# Patient Record
Sex: FEMALE
[De-identification: names, ages, dates, MRNs, and addresses within clinical notes are randomized per-mention and may not be internally consistent; named-entity substitution may affect disease eponyms.]

---

## 2017-06-19 ENCOUNTER — HOSPITAL (OUTPATIENT)
Dept: OTHER | Age: 57
End: 2017-06-19

## 2018-01-01 ENCOUNTER — ANESTHESIA EVENT (OUTPATIENT)
Dept: ENDOSCOPY | Facility: HOSPITAL | Age: 58
DRG: 181 | End: 2018-01-01
Payer: COMMERCIAL

## 2018-01-01 ENCOUNTER — OFFICE VISIT (OUTPATIENT)
Dept: HEMATOLOGY/ONCOLOGY | Facility: HOSPITAL | Age: 58
End: 2018-01-01
Attending: SURGERY
Payer: COMMERCIAL

## 2018-01-01 ENCOUNTER — OFFICE VISIT (OUTPATIENT)
Dept: HEMATOLOGY/ONCOLOGY | Facility: HOSPITAL | Age: 58
End: 2018-01-01
Attending: INTERNAL MEDICINE
Payer: COMMERCIAL

## 2018-01-01 ENCOUNTER — LAB ENCOUNTER (OUTPATIENT)
Dept: LAB | Facility: HOSPITAL | Age: 58
End: 2018-01-01
Attending: INTERNAL MEDICINE
Payer: COMMERCIAL

## 2018-01-01 ENCOUNTER — HOSPITAL ENCOUNTER (OUTPATIENT)
Dept: CV DIAGNOSTICS | Facility: HOSPITAL | Age: 58
Discharge: HOME | End: 2018-01-01
Attending: PATHOLOGY
Payer: COMMERCIAL

## 2018-01-01 ENCOUNTER — OFFICE VISIT (OUTPATIENT)
Dept: HEMATOLOGY/ONCOLOGY | Age: 58
End: 2018-01-01
Attending: INTERNAL MEDICINE
Payer: COMMERCIAL

## 2018-01-01 ENCOUNTER — HOSPITAL ENCOUNTER (OUTPATIENT)
Dept: CT IMAGING | Facility: HOSPITAL | Age: 58
Discharge: HOME | End: 2018-01-01
Attending: INTERNAL MEDICINE
Payer: COMMERCIAL

## 2018-01-01 ENCOUNTER — APPOINTMENT (OUTPATIENT)
Dept: CT IMAGING | Facility: HOSPITAL | Age: 58
DRG: 871 | End: 2018-01-01
Attending: EMERGENCY MEDICINE
Payer: COMMERCIAL

## 2018-01-01 ENCOUNTER — TELEPHONE (OUTPATIENT)
Dept: HEMATOLOGY/ONCOLOGY | Facility: HOSPITAL | Age: 58
End: 2018-01-01

## 2018-01-01 ENCOUNTER — ANESTHESIA (OUTPATIENT)
Dept: ENDOSCOPY | Facility: HOSPITAL | Age: 58
DRG: 181 | End: 2018-01-01
Payer: COMMERCIAL

## 2018-01-01 ENCOUNTER — ANESTHESIA (OUTPATIENT)
Dept: SURGERY | Facility: HOSPITAL | Age: 58
End: 2018-01-01

## 2018-01-01 ENCOUNTER — NURSE ONLY (OUTPATIENT)
Dept: HEMATOLOGY/ONCOLOGY | Age: 58
End: 2018-01-01
Attending: INTERNAL MEDICINE
Payer: COMMERCIAL

## 2018-01-01 ENCOUNTER — MED REC SCAN ONLY (OUTPATIENT)
Dept: HEMATOLOGY/ONCOLOGY | Facility: HOSPITAL | Age: 58
End: 2018-01-01

## 2018-01-01 ENCOUNTER — SOCIAL WORK SERVICES (OUTPATIENT)
Dept: HEMATOLOGY/ONCOLOGY | Facility: HOSPITAL | Age: 58
End: 2018-01-01

## 2018-01-01 ENCOUNTER — APPOINTMENT (OUTPATIENT)
Dept: CV DIAGNOSTICS | Facility: HOSPITAL | Age: 58
DRG: 181 | End: 2018-01-01
Attending: INTERNAL MEDICINE
Payer: COMMERCIAL

## 2018-01-01 ENCOUNTER — HOSPITAL ENCOUNTER (OUTPATIENT)
Facility: HOSPITAL | Age: 58
Setting detail: HOSPITAL OUTPATIENT SURGERY
Discharge: HOME | End: 2018-01-01
Attending: SURGERY | Admitting: SURGERY
Payer: COMMERCIAL

## 2018-01-01 ENCOUNTER — SURGERY (OUTPATIENT)
Age: 58
End: 2018-01-01

## 2018-01-01 ENCOUNTER — APPOINTMENT (OUTPATIENT)
Dept: GENERAL RADIOLOGY | Facility: HOSPITAL | Age: 58
DRG: 871 | End: 2018-01-01
Attending: INTERNAL MEDICINE
Payer: COMMERCIAL

## 2018-01-01 ENCOUNTER — APPOINTMENT (OUTPATIENT)
Dept: GENERAL RADIOLOGY | Facility: HOSPITAL | Age: 58
End: 2018-01-01
Attending: SURGERY
Payer: COMMERCIAL

## 2018-01-01 ENCOUNTER — APPOINTMENT (OUTPATIENT)
Dept: CV DIAGNOSTICS | Facility: HOSPITAL | Age: 58
DRG: 871 | End: 2018-01-01
Attending: INTERNAL MEDICINE
Payer: COMMERCIAL

## 2018-01-01 ENCOUNTER — HOSPITAL ENCOUNTER (INPATIENT)
Dept: CV DIAGNOSTICS | Facility: HOSPITAL | Age: 58
LOS: 4 days | Discharge: HOME | DRG: 181 | End: 2018-01-01
Attending: HOSPITALIST | Admitting: HOSPITALIST
Payer: COMMERCIAL

## 2018-01-01 ENCOUNTER — HOSPITAL ENCOUNTER (INPATIENT)
Facility: HOSPITAL | Age: 58
LOS: 11 days | Discharge: HOSPICE | DRG: 871 | End: 2018-01-01
Attending: EMERGENCY MEDICINE | Admitting: INTERNAL MEDICINE
Payer: COMMERCIAL

## 2018-01-01 ENCOUNTER — HOSPITAL ENCOUNTER (OUTPATIENT)
Dept: CV DIAGNOSTICS | Facility: HOSPITAL | Age: 58
Discharge: HOME | End: 2018-01-01
Attending: INTERNAL MEDICINE
Payer: COMMERCIAL

## 2018-01-01 ENCOUNTER — APPOINTMENT (OUTPATIENT)
Dept: CT IMAGING | Facility: HOSPITAL | Age: 58
DRG: 871 | End: 2018-01-01
Attending: INTERNAL MEDICINE
Payer: COMMERCIAL

## 2018-01-01 ENCOUNTER — APPOINTMENT (OUTPATIENT)
Dept: ULTRASOUND IMAGING | Facility: HOSPITAL | Age: 58
DRG: 871 | End: 2018-01-01
Attending: INTERNAL MEDICINE
Payer: COMMERCIAL

## 2018-01-01 ENCOUNTER — ANESTHESIA EVENT (OUTPATIENT)
Dept: SURGERY | Facility: HOSPITAL | Age: 58
End: 2018-01-01

## 2018-01-01 ENCOUNTER — APPOINTMENT (OUTPATIENT)
Dept: HEMATOLOGY/ONCOLOGY | Age: 58
End: 2018-01-01
Attending: INTERNAL MEDICINE
Payer: COMMERCIAL

## 2018-01-01 VITALS
HEART RATE: 125 BPM | DIASTOLIC BLOOD PRESSURE: 89 MMHG | SYSTOLIC BLOOD PRESSURE: 138 MMHG | RESPIRATION RATE: 18 BRPM | TEMPERATURE: 97 F | WEIGHT: 168.19 LBS | BODY MASS INDEX: 29 KG/M2 | OXYGEN SATURATION: 94 %

## 2018-01-01 VITALS
BODY MASS INDEX: 28.47 KG/M2 | SYSTOLIC BLOOD PRESSURE: 106 MMHG | DIASTOLIC BLOOD PRESSURE: 77 MMHG | HEART RATE: 117 BPM | RESPIRATION RATE: 18 BRPM | WEIGHT: 166.75 LBS | TEMPERATURE: 98 F | OXYGEN SATURATION: 93 % | HEIGHT: 64 IN

## 2018-01-01 VITALS
BODY MASS INDEX: 27 KG/M2 | HEART RATE: 115 BPM | OXYGEN SATURATION: 93 % | TEMPERATURE: 98 F | DIASTOLIC BLOOD PRESSURE: 70 MMHG | SYSTOLIC BLOOD PRESSURE: 115 MMHG | RESPIRATION RATE: 16 BRPM | WEIGHT: 157.44 LBS

## 2018-01-01 VITALS
OXYGEN SATURATION: 88 % | HEART RATE: 137 BPM | TEMPERATURE: 99 F | SYSTOLIC BLOOD PRESSURE: 112 MMHG | DIASTOLIC BLOOD PRESSURE: 77 MMHG | WEIGHT: 171.5 LBS | BODY MASS INDEX: 30 KG/M2 | RESPIRATION RATE: 22 BRPM

## 2018-01-01 VITALS
HEIGHT: 63.78 IN | WEIGHT: 168.38 LBS | SYSTOLIC BLOOD PRESSURE: 132 MMHG | DIASTOLIC BLOOD PRESSURE: 84 MMHG | HEART RATE: 120 BPM | RESPIRATION RATE: 20 BRPM | BODY MASS INDEX: 29.1 KG/M2 | OXYGEN SATURATION: 96 % | TEMPERATURE: 99 F

## 2018-01-01 VITALS
OXYGEN SATURATION: 97 % | DIASTOLIC BLOOD PRESSURE: 78 MMHG | HEART RATE: 112 BPM | TEMPERATURE: 98 F | RESPIRATION RATE: 20 BRPM | SYSTOLIC BLOOD PRESSURE: 112 MMHG

## 2018-01-01 VITALS
DIASTOLIC BLOOD PRESSURE: 72 MMHG | OXYGEN SATURATION: 97 % | BODY MASS INDEX: 32.21 KG/M2 | HEART RATE: 109 BPM | SYSTOLIC BLOOD PRESSURE: 113 MMHG | HEIGHT: 64 IN | TEMPERATURE: 99 F | WEIGHT: 188.69 LBS | RESPIRATION RATE: 18 BRPM

## 2018-01-01 DIAGNOSIS — C79.51 BONE METASTASIS (HCC): ICD-10-CM

## 2018-01-01 DIAGNOSIS — I31.3 PERICARDIAL EFFUSION: ICD-10-CM

## 2018-01-01 DIAGNOSIS — C80.1 MALIGNANT PERICARDIAL EFFUSION (HCC): ICD-10-CM

## 2018-01-01 DIAGNOSIS — C34.91 ADENOCARCINOMA OF RIGHT LUNG (HCC): Primary | ICD-10-CM

## 2018-01-01 DIAGNOSIS — R61 GENERALIZED HYPERHIDROSIS: ICD-10-CM

## 2018-01-01 DIAGNOSIS — R50.9 FEVER, UNSPECIFIED: Primary | ICD-10-CM

## 2018-01-01 DIAGNOSIS — R50.81 FEVER IN OTHER DISEASES: Primary | ICD-10-CM

## 2018-01-01 DIAGNOSIS — I31.1 CONSTRICTIVE PERICARDITIS: ICD-10-CM

## 2018-01-01 DIAGNOSIS — H33.22 EXUDATIVE RETINAL DETACHMENT OF LEFT EYE: ICD-10-CM

## 2018-01-01 DIAGNOSIS — G93.3 POST VIRAL SYNDROME: ICD-10-CM

## 2018-01-01 DIAGNOSIS — R79.82 CRP ELEVATED: ICD-10-CM

## 2018-01-01 DIAGNOSIS — D72.829 LEUKOCYTOSIS, UNSPECIFIED TYPE: ICD-10-CM

## 2018-01-01 DIAGNOSIS — C78.7 LIVER METASTASIS (HCC): ICD-10-CM

## 2018-01-01 DIAGNOSIS — R74.01 TRANSAMINITIS: ICD-10-CM

## 2018-01-01 DIAGNOSIS — R79.89 ELEVATED LFTS: ICD-10-CM

## 2018-01-01 DIAGNOSIS — D72.829 LEUKOCYTOSIS (LEUCOCYTOSIS): ICD-10-CM

## 2018-01-01 DIAGNOSIS — R21 FACIAL RASH: ICD-10-CM

## 2018-01-01 DIAGNOSIS — R01.1 HEART MURMUR: ICD-10-CM

## 2018-01-01 DIAGNOSIS — R74.02 ELEVATED LDH: ICD-10-CM

## 2018-01-01 DIAGNOSIS — R79.82 ELEVATED C-REACTIVE PROTEIN (CRP): ICD-10-CM

## 2018-01-01 DIAGNOSIS — C79.31 SECONDARY CANCER OF BRAIN (HCC): ICD-10-CM

## 2018-01-01 DIAGNOSIS — R06.02 SOB (SHORTNESS OF BREATH): ICD-10-CM

## 2018-01-01 DIAGNOSIS — R77.8 ELEVATED TROPONIN: ICD-10-CM

## 2018-01-01 DIAGNOSIS — R06.00 DYSPNEA, UNSPECIFIED TYPE: ICD-10-CM

## 2018-01-01 DIAGNOSIS — C34.91 ADENOCARCINOMA OF RIGHT LUNG (HCC): ICD-10-CM

## 2018-01-01 DIAGNOSIS — C34.90 PRIMARY MALIGNANT NEOPLASM OF LUNG METASTATIC TO OTHER SITE, UNSPECIFIED LATERALITY (HCC): ICD-10-CM

## 2018-01-01 DIAGNOSIS — I31.3 MALIGNANT PERICARDIAL EFFUSION (HCC): ICD-10-CM

## 2018-01-01 DIAGNOSIS — C79.31 SECONDARY CANCER OF BRAIN (HCC): Primary | ICD-10-CM

## 2018-01-01 DIAGNOSIS — R74.01 NONSPECIFIC ELEVATION OF LEVELS OF TRANSAMINASE OR LACTIC ACID DEHYDROGENASE (LDH): Primary | ICD-10-CM

## 2018-01-01 DIAGNOSIS — Z71.9 ENCOUNTER FOR HEALTH EDUCATION: Primary | ICD-10-CM

## 2018-01-01 DIAGNOSIS — R50.9 FEVER OF UNKNOWN ORIGIN: ICD-10-CM

## 2018-01-01 DIAGNOSIS — R53.1 WEAKNESS: ICD-10-CM

## 2018-01-01 DIAGNOSIS — R74.01 ELEVATED TRANSAMINASE LEVEL: ICD-10-CM

## 2018-01-01 DIAGNOSIS — R05.9 COUGH: ICD-10-CM

## 2018-01-01 DIAGNOSIS — R74.02 NONSPECIFIC ELEVATION OF LEVELS OF TRANSAMINASE OR LACTIC ACID DEHYDROGENASE (LDH): Primary | ICD-10-CM

## 2018-01-01 DIAGNOSIS — E87.6 HYPOKALEMIA: ICD-10-CM

## 2018-01-01 DIAGNOSIS — D68.9 COAGULOPATHY (HCC): ICD-10-CM

## 2018-01-01 DIAGNOSIS — R93.89 ABNORMAL FINDINGS ON IMAGING TEST: ICD-10-CM

## 2018-01-01 DIAGNOSIS — D69.6 THROMBOCYTOPENIA (HCC): ICD-10-CM

## 2018-01-01 LAB
ALBUMIN SERPL-MCNC: 2.9 G/DL (ref 3.5–4.8)
ALBUMIN SERPL-MCNC: 3.1 G/DL (ref 3.5–4.8)
ALBUMIN SERPL-MCNC: 3.3 G/DL (ref 3.5–4.8)
ALP LIVER SERPL-CCNC: 194 U/L (ref 46–118)
ALP LIVER SERPL-CCNC: 234 U/L (ref 46–118)
ALP LIVER SERPL-CCNC: 275 U/L (ref 46–118)
ALT SERPL-CCNC: 40 U/L (ref 14–54)
ALT SERPL-CCNC: 54 U/L (ref 14–54)
ALT SERPL-CCNC: 87 U/L (ref 14–54)
ANA SCREEN: NEGATIVE
ANGIOTENSIN CONVERTING ENZYME: 45 U/L
AST SERPL-CCNC: 121 U/L (ref 15–41)
AST SERPL-CCNC: 168 U/L (ref 15–41)
AST SERPL-CCNC: 98 U/L (ref 15–41)
BASOPHILS # BLD AUTO: 0.06 X10(3) UL (ref 0–0.1)
BASOPHILS # BLD AUTO: 0.08 X10(3) UL (ref 0–0.1)
BASOPHILS # BLD AUTO: 0.08 X10(3) UL (ref 0–0.1)
BASOPHILS NFR BLD AUTO: 0.3 %
BASOPHILS NFR BLD AUTO: 0.5 %
BASOPHILS NFR BLD AUTO: 0.5 %
BILIRUB SERPL-MCNC: 0.5 MG/DL (ref 0.1–2)
BILIRUB SERPL-MCNC: 0.6 MG/DL (ref 0.1–2)
BILIRUB SERPL-MCNC: 0.7 MG/DL (ref 0.1–2)
BUN BLD-MCNC: 10 MG/DL (ref 8–20)
BUN BLD-MCNC: 12 MG/DL (ref 8–20)
BUN BLD-MCNC: 15 MG/DL (ref 8–20)
C-REACTIVE PROTEIN: 8.82 MG/DL (ref ?–1)
C-REACTIVE PROTEIN: 9.85 MG/DL (ref ?–1)
CALCIUM BLD-MCNC: 9.1 MG/DL (ref 8.3–10.3)
CALCIUM BLD-MCNC: 9.4 MG/DL (ref 8.3–10.3)
CALCIUM BLD-MCNC: 9.5 MG/DL (ref 8.3–10.3)
CHLORIDE: 101 MMOL/L (ref 101–111)
CO2: 24 MMOL/L (ref 22–32)
CO2: 25 MMOL/L (ref 22–32)
CO2: 27 MMOL/L (ref 22–32)
CREAT BLD-MCNC: 0.56 MG/DL (ref 0.55–1.02)
CREAT BLD-MCNC: 0.67 MG/DL (ref 0.55–1.02)
CREAT BLD-MCNC: 0.67 MG/DL (ref 0.55–1.02)
CYCLIC CITRULLINATED PEPTIDE: 2 UNITS
EOSINOPHIL # BLD AUTO: 0.01 X10(3) UL (ref 0–0.3)
EOSINOPHIL # BLD AUTO: 0.04 X10(3) UL (ref 0–0.3)
EOSINOPHIL # BLD AUTO: 0.08 X10(3) UL (ref 0–0.3)
EOSINOPHIL NFR BLD AUTO: 0 %
EOSINOPHIL NFR BLD AUTO: 0.3 %
EOSINOPHIL NFR BLD AUTO: 0.5 %
ERYTHROCYTE [DISTWIDTH] IN BLOOD BY AUTOMATED COUNT: 11.9 % (ref 11.5–16)
ERYTHROCYTE [DISTWIDTH] IN BLOOD BY AUTOMATED COUNT: 12.1 % (ref 11.5–16)
ERYTHROCYTE [DISTWIDTH] IN BLOOD BY AUTOMATED COUNT: 12.9 % (ref 11.5–16)
GLUCOSE BLD-MCNC: 101 MG/DL (ref 70–99)
GLUCOSE BLD-MCNC: 85 MG/DL (ref 70–99)
GLUCOSE BLD-MCNC: 87 MG/DL (ref 70–99)
HCT VFR BLD AUTO: 37.5 % (ref 34–50)
HCT VFR BLD AUTO: 39.7 % (ref 34–50)
HCT VFR BLD AUTO: 41.3 % (ref 34–50)
HGB BLD-MCNC: 12.3 G/DL (ref 12–16)
HGB BLD-MCNC: 13 G/DL (ref 12–16)
HGB BLD-MCNC: 13.6 G/DL (ref 12–16)
IMMATURE GRANULOCYTE COUNT: 0.06 X10(3) UL (ref 0–1)
IMMATURE GRANULOCYTE COUNT: 0.21 X10(3) UL (ref 0–1)
IMMATURE GRANULOCYTE COUNT: 0.41 X10(3) UL (ref 0–1)
IMMATURE GRANULOCYTE RATIO %: 0.5 %
IMMATURE GRANULOCYTE RATIO %: 1.4 %
IMMATURE GRANULOCYTE RATIO %: 1.6 %
LACTATE DEHYDROGENASE - 1: 22 %
LACTATE DEHYDROGENASE - 2: 39 %
LACTATE DEHYDROGENASE - 3: 23 %
LACTATE DEHYDROGENASE - 4: 10 %
LACTATE DEHYDROGENASE - 5: 6 %
LACTATE DEHYDROGENASE, TOTAL: 3047 U/L
LDH: 3090 U/L (ref 84–246)
LYMPHOCYTES # BLD AUTO: 2 X10(3) UL (ref 0.9–4)
LYMPHOCYTES # BLD AUTO: 2.64 X10(3) UL (ref 0.9–4)
LYMPHOCYTES # BLD AUTO: 4.07 X10(3) UL (ref 0.9–4)
LYMPHOCYTES NFR BLD AUTO: 15.4 %
LYMPHOCYTES NFR BLD AUTO: 15.7 %
LYMPHOCYTES NFR BLD AUTO: 17.4 %
M PROTEIN MFR SERPL ELPH: 7.2 G/DL (ref 6.1–8.3)
M PROTEIN MFR SERPL ELPH: 7.2 G/DL (ref 6.1–8.3)
M PROTEIN MFR SERPL ELPH: 7.5 G/DL (ref 6.1–8.3)
MCH RBC QN AUTO: 28 PG (ref 27–33.2)
MCH RBC QN AUTO: 28.2 PG (ref 27–33.2)
MCH RBC QN AUTO: 28.5 PG (ref 27–33.2)
MCHC RBC AUTO-ENTMCNC: 32.7 G/DL (ref 31–37)
MCHC RBC AUTO-ENTMCNC: 32.8 G/DL (ref 31–37)
MCHC RBC AUTO-ENTMCNC: 32.9 G/DL (ref 31–37)
MCV RBC AUTO: 85.4 FL (ref 81–100)
MCV RBC AUTO: 85.7 FL (ref 81–100)
MCV RBC AUTO: 86.8 FL (ref 81–100)
MONOCYTES # BLD AUTO: 0.81 X10(3) UL (ref 0.1–1)
MONOCYTES # BLD AUTO: 0.91 X10(3) UL (ref 0.1–1)
MONOCYTES # BLD AUTO: 1.46 X10(3) UL (ref 0.1–1)
MONOCYTES NFR BLD AUTO: 5.6 %
MONOCYTES NFR BLD AUTO: 6 %
MONOCYTES NFR BLD AUTO: 6.2 %
NEUTROPHIL ABS PRELIM: 10.02 X10 (3) UL (ref 1.3–6.7)
NEUTROPHIL ABS PRELIM: 11.23 X10 (3) UL (ref 1.3–6.7)
NEUTROPHIL ABS PRELIM: 19.85 X10 (3) UL (ref 1.3–6.7)
NEUTROPHILS # BLD AUTO: 10.02 X10(3) UL (ref 1.3–6.7)
NEUTROPHILS # BLD AUTO: 11.23 X10(3) UL (ref 1.3–6.7)
NEUTROPHILS # BLD AUTO: 19.85 X10(3) UL (ref 1.3–6.7)
NEUTROPHILS NFR BLD AUTO: 74.2 %
NEUTROPHILS NFR BLD AUTO: 76.8 %
NEUTROPHILS NFR BLD AUTO: 77.1 %
PLATELET # BLD AUTO: 290 10(3)UL (ref 150–450)
PLATELET # BLD AUTO: 315 10(3)UL (ref 150–450)
PLATELET # BLD AUTO: 334 10(3)UL (ref 150–450)
POTASSIUM SERPL-SCNC: 3.8 MMOL/L (ref 3.6–5.1)
POTASSIUM SERPL-SCNC: 4.2 MMOL/L (ref 3.6–5.1)
POTASSIUM SERPL-SCNC: 4.6 MMOL/L (ref 3.6–5.1)
RBC # BLD AUTO: 4.32 X10(6)UL (ref 3.8–5.1)
RBC # BLD AUTO: 4.65 X10(6)UL (ref 3.8–5.1)
RBC # BLD AUTO: 4.82 X10(6)UL (ref 3.8–5.1)
RED CELL DISTRIBUTION WIDTH-SD: 37.2 FL (ref 35.1–46.3)
RED CELL DISTRIBUTION WIDTH-SD: 37.9 FL (ref 35.1–46.3)
RED CELL DISTRIBUTION WIDTH-SD: 39.6 FL (ref 35.1–46.3)
RHEUMATOID FACT SERPL-ACNC: <10 IU/ML (ref ?–15)
RIBONUCLEIC PROTEIN (U1) (ENA) AB, IGG: 0 AU/ML
SED RATE-ML: 15 MM/HR (ref 0–25)
SED RATE-ML: 24 MM/HR (ref 0–25)
SED RATE-ML: 26 MM/HR (ref 0–25)
SMITH (ENA) ANTIBODY, IGG: 0 AU/ML
SODIUM SERPL-SCNC: 134 MMOL/L (ref 136–144)
SODIUM SERPL-SCNC: 135 MMOL/L (ref 136–144)
SODIUM SERPL-SCNC: 137 MMOL/L (ref 136–144)
SSA 52 (RO) (ENA) ANTIBODY, IGG: 3 AU/ML
SSA 60 (RO) (ENA) ANTIBODY, IGG: 0 AU/ML
SSB (LA) (ENA) ANTIBODY, IGG: 4 AU/ML
WBC # BLD AUTO: 13 X10(3) UL (ref 4–13)
WBC # BLD AUTO: 15.2 X10(3) UL (ref 4–13)
WBC # BLD AUTO: 25.9 X10(3) UL (ref 4–13)

## 2018-01-01 PROCEDURE — 30233R1 TRANSFUSION OF NONAUTOLOGOUS PLATELETS INTO PERIPHERAL VEIN, PERCUTANEOUS APPROACH: ICD-10-PCS | Performed by: HOSPITALIST

## 2018-01-01 PROCEDURE — 83615 LACTATE (LD) (LDH) ENZYME: CPT

## 2018-01-01 PROCEDURE — 81235 EGFR GENE COM VARIANTS: CPT

## 2018-01-01 PROCEDURE — 88381 MICRODISSECTION MANUAL: CPT

## 2018-01-01 PROCEDURE — 83516 IMMUNOASSAY NONANTIBODY: CPT | Performed by: INTERNAL MEDICINE

## 2018-01-01 PROCEDURE — 0JH60WZ INSERTION OF TOTALLY IMPLANTABLE VASCULAR ACCESS DEVICE INTO CHEST SUBCUTANEOUS TISSUE AND FASCIA, OPEN APPROACH: ICD-10-PCS | Performed by: SURGERY

## 2018-01-01 PROCEDURE — 36415 COLL VENOUS BLD VENIPUNCTURE: CPT

## 2018-01-01 PROCEDURE — 80053 COMPREHEN METABOLIC PANEL: CPT

## 2018-01-01 PROCEDURE — 82164 ANGIOTENSIN I ENZYME TEST: CPT

## 2018-01-01 PROCEDURE — 99233 SBSQ HOSP IP/OBS HIGH 50: CPT | Performed by: INTERNAL MEDICINE

## 2018-01-01 PROCEDURE — 99232 SBSQ HOSP IP/OBS MODERATE 35: CPT | Performed by: INTERNAL MEDICINE

## 2018-01-01 PROCEDURE — 88305 TISSUE EXAM BY PATHOLOGIST: CPT | Performed by: INTERNAL MEDICINE

## 2018-01-01 PROCEDURE — 93307 TTE W/O DOPPLER COMPLETE: CPT | Performed by: INTERNAL MEDICINE

## 2018-01-01 PROCEDURE — 85025 COMPLETE CBC W/AUTO DIFF WBC: CPT

## 2018-01-01 PROCEDURE — 86618 LYME DISEASE ANTIBODY: CPT | Performed by: INTERNAL MEDICINE

## 2018-01-01 PROCEDURE — 96372 THER/PROPH/DIAG INJ SC/IM: CPT

## 2018-01-01 PROCEDURE — 84443 ASSAY THYROID STIM HORMONE: CPT | Performed by: INTERNAL MEDICINE

## 2018-01-01 PROCEDURE — 71045 X-RAY EXAM CHEST 1 VIEW: CPT | Performed by: INTERNAL MEDICINE

## 2018-01-01 PROCEDURE — 74176 CT ABD & PELVIS W/O CONTRAST: CPT | Performed by: INTERNAL MEDICINE

## 2018-01-01 PROCEDURE — 93306 TTE W/DOPPLER COMPLETE: CPT | Performed by: INTERNAL MEDICINE

## 2018-01-01 PROCEDURE — 86200 CCP ANTIBODY: CPT

## 2018-01-01 PROCEDURE — 85652 RBC SED RATE AUTOMATED: CPT

## 2018-01-01 PROCEDURE — 96367 TX/PROPH/DG ADDL SEQ IV INF: CPT

## 2018-01-01 PROCEDURE — 84439 ASSAY OF FREE THYROXINE: CPT | Performed by: INTERNAL MEDICINE

## 2018-01-01 PROCEDURE — 93010 ELECTROCARDIOGRAM REPORT: CPT | Performed by: INTERNAL MEDICINE

## 2018-01-01 PROCEDURE — 99214 OFFICE O/P EST MOD 30 MIN: CPT | Performed by: INTERNAL MEDICINE

## 2018-01-01 PROCEDURE — 86747 PARVOVIRUS ANTIBODY: CPT | Performed by: INTERNAL MEDICINE

## 2018-01-01 PROCEDURE — 71250 CT THORAX DX C-: CPT | Performed by: EMERGENCY MEDICINE

## 2018-01-01 PROCEDURE — 36591 DRAW BLOOD OFF VENOUS DEVICE: CPT

## 2018-01-01 PROCEDURE — 88342 IMHCHEM/IMCYTCHM 1ST ANTB: CPT

## 2018-01-01 PROCEDURE — 93306 TTE W/DOPPLER COMPLETE: CPT | Performed by: PATHOLOGY

## 2018-01-01 PROCEDURE — 96413 CHEMO IV INFUSION 1 HR: CPT

## 2018-01-01 PROCEDURE — 99221 1ST HOSP IP/OBS SF/LOW 40: CPT | Performed by: CLINICAL NURSE SPECIALIST

## 2018-01-01 PROCEDURE — 99291 CRITICAL CARE FIRST HOUR: CPT | Performed by: INTERNAL MEDICINE

## 2018-01-01 PROCEDURE — 88360 TUMOR IMMUNOHISTOCHEM/MANUAL: CPT

## 2018-01-01 PROCEDURE — 85025 COMPLETE CBC W/AUTO DIFF WBC: CPT | Performed by: INTERNAL MEDICINE

## 2018-01-01 PROCEDURE — 86140 C-REACTIVE PROTEIN: CPT

## 2018-01-01 PROCEDURE — 86235 NUCLEAR ANTIGEN ANTIBODY: CPT | Performed by: INTERNAL MEDICINE

## 2018-01-01 PROCEDURE — 99231 SBSQ HOSP IP/OBS SF/LOW 25: CPT | Performed by: CLINICAL NURSE SPECIALIST

## 2018-01-01 PROCEDURE — 74160 CT ABDOMEN W/CONTRAST: CPT | Performed by: INTERNAL MEDICINE

## 2018-01-01 PROCEDURE — 76705 ECHO EXAM OF ABDOMEN: CPT | Performed by: INTERNAL MEDICINE

## 2018-01-01 PROCEDURE — 85025 COMPLETE CBC W/AUTO DIFF WBC: CPT | Performed by: HOSPITALIST

## 2018-01-01 PROCEDURE — 88307 TISSUE EXAM BY PATHOLOGIST: CPT | Performed by: INTERNAL MEDICINE

## 2018-01-01 PROCEDURE — 88342 IMHCHEM/IMCYTCHM 1ST ANTB: CPT | Performed by: INTERNAL MEDICINE

## 2018-01-01 PROCEDURE — 88173 CYTOPATH EVAL FNA REPORT: CPT | Performed by: INTERNAL MEDICINE

## 2018-01-01 PROCEDURE — 30233K1 TRANSFUSION OF NONAUTOLOGOUS FROZEN PLASMA INTO PERIPHERAL VEIN, PERCUTANEOUS APPROACH: ICD-10-PCS | Performed by: HOSPITALIST

## 2018-01-01 PROCEDURE — 80048 BASIC METABOLIC PNL TOTAL CA: CPT | Performed by: HOSPITALIST

## 2018-01-01 PROCEDURE — 83876 ASSAY MYELOPEROXIDASE: CPT | Performed by: INTERNAL MEDICINE

## 2018-01-01 PROCEDURE — 87340 HEPATITIS B SURFACE AG IA: CPT | Performed by: INTERNAL MEDICINE

## 2018-01-01 PROCEDURE — 80053 COMPREHEN METABOLIC PANEL: CPT | Performed by: INTERNAL MEDICINE

## 2018-01-01 PROCEDURE — 86431 RHEUMATOID FACTOR QUANT: CPT

## 2018-01-01 PROCEDURE — 99233 SBSQ HOSP IP/OBS HIGH 50: CPT | Performed by: STUDENT IN AN ORGANIZED HEALTH CARE EDUCATION/TRAINING PROGRAM

## 2018-01-01 PROCEDURE — 99215 OFFICE O/P EST HI 40 MIN: CPT | Performed by: CLINICAL NURSE SPECIALIST

## 2018-01-01 PROCEDURE — 99205 OFFICE O/P NEW HI 60 MIN: CPT | Performed by: INTERNAL MEDICINE

## 2018-01-01 PROCEDURE — 83625 ASSAY OF LDH ENZYMES: CPT

## 2018-01-01 PROCEDURE — 86038 ANTINUCLEAR ANTIBODIES: CPT

## 2018-01-01 PROCEDURE — 96417 CHEMO IV INFUS EACH ADDL SEQ: CPT

## 2018-01-01 PROCEDURE — 02HV33Z INSERTION OF INFUSION DEVICE INTO SUPERIOR VENA CAVA, PERCUTANEOUS APPROACH: ICD-10-PCS | Performed by: SURGERY

## 2018-01-01 PROCEDURE — 86235 NUCLEAR ANTIGEN ANTIBODY: CPT

## 2018-01-01 PROCEDURE — 80048 BASIC METABOLIC PNL TOTAL CA: CPT | Performed by: INTERNAL MEDICINE

## 2018-01-01 PROCEDURE — 86403 PARTICLE AGGLUT ANTBDY SCRN: CPT | Performed by: INTERNAL MEDICINE

## 2018-01-01 PROCEDURE — 83735 ASSAY OF MAGNESIUM: CPT

## 2018-01-01 PROCEDURE — 71250 CT THORAX DX C-: CPT | Performed by: INTERNAL MEDICINE

## 2018-01-01 PROCEDURE — 86038 ANTINUCLEAR ANTIBODIES: CPT | Performed by: INTERNAL MEDICINE

## 2018-01-01 PROCEDURE — 99254 IP/OBS CNSLTJ NEW/EST MOD 60: CPT | Performed by: INTERNAL MEDICINE

## 2018-01-01 PROCEDURE — 86225 DNA ANTIBODY NATIVE: CPT

## 2018-01-01 PROCEDURE — 88108 CYTOPATH CONCENTRATE TECH: CPT | Performed by: INTERNAL MEDICINE

## 2018-01-01 PROCEDURE — 71260 CT THORAX DX C+: CPT | Performed by: INTERNAL MEDICINE

## 2018-01-01 PROCEDURE — 88341 IMHCHEM/IMCYTCHM EA ADD ANTB: CPT | Performed by: INTERNAL MEDICINE

## 2018-01-01 PROCEDURE — 81210 BRAF GENE: CPT

## 2018-01-01 PROCEDURE — 96375 TX/PRO/DX INJ NEW DRUG ADDON: CPT

## 2018-01-01 PROCEDURE — 86803 HEPATITIS C AB TEST: CPT | Performed by: INTERNAL MEDICINE

## 2018-01-01 PROCEDURE — 36415 COLL VENOUS BLD VENIPUNCTURE: CPT | Performed by: INTERNAL MEDICINE

## 2018-01-01 PROCEDURE — 93005 ELECTROCARDIOGRAM TRACING: CPT

## 2018-01-01 PROCEDURE — 88341 IMHCHEM/IMCYTCHM EA ADD ANTB: CPT

## 2018-01-01 PROCEDURE — 70450 CT HEAD/BRAIN W/O DYE: CPT | Performed by: EMERGENCY MEDICINE

## 2018-01-01 PROCEDURE — 30233N1 TRANSFUSION OF NONAUTOLOGOUS RED BLOOD CELLS INTO PERIPHERAL VEIN, PERCUTANEOUS APPROACH: ICD-10-PCS | Performed by: HOSPITALIST

## 2018-01-01 PROCEDURE — 86706 HEP B SURFACE ANTIBODY: CPT | Performed by: INTERNAL MEDICINE

## 2018-01-01 PROCEDURE — 30233M1 TRANSFUSION OF NONAUTOLOGOUS PLASMA CRYOPRECIPITATE INTO PERIPHERAL VEIN, PERCUTANEOUS APPROACH: ICD-10-PCS | Performed by: HOSPITALIST

## 2018-01-01 PROCEDURE — 07D78ZX EXTRACTION OF THORAX LYMPHATIC, VIA NATURAL OR ARTIFICIAL OPENING ENDOSCOPIC, DIAGNOSTIC: ICD-10-PCS | Performed by: INTERNAL MEDICINE

## 2018-01-01 PROCEDURE — 77001 FLUOROGUIDE FOR VEIN DEVICE: CPT | Performed by: SURGERY

## 2018-01-01 PROCEDURE — 99214 OFFICE O/P EST MOD 30 MIN: CPT | Performed by: CLINICAL NURSE SPECIALIST

## 2018-01-01 PROCEDURE — 74177 CT ABD & PELVIS W/CONTRAST: CPT | Performed by: INTERNAL MEDICINE

## 2018-01-01 DEVICE — VACCESS CT POWER-INJECTABLE IMPLANTABLE PORT (WITH SUTURE PLUGS) (8F)
Type: IMPLANTABLE DEVICE | Site: CHEST | Status: FUNCTIONAL
Brand: VACCESS

## 2018-01-01 RX ORDER — SODIUM CHLORIDE, SODIUM LACTATE, POTASSIUM CHLORIDE, CALCIUM CHLORIDE 600; 310; 30; 20 MG/100ML; MG/100ML; MG/100ML; MG/100ML
INJECTION, SOLUTION INTRAVENOUS CONTINUOUS
Status: DISCONTINUED | OUTPATIENT
Start: 2018-01-01 | End: 2018-01-01 | Stop reason: HOSPADM

## 2018-01-01 RX ORDER — SODIUM CHLORIDE, SODIUM LACTATE, POTASSIUM CHLORIDE, CALCIUM CHLORIDE 600; 310; 30; 20 MG/100ML; MG/100ML; MG/100ML; MG/100ML
INJECTION, SOLUTION INTRAVENOUS CONTINUOUS
Status: DISCONTINUED | OUTPATIENT
Start: 2018-01-01 | End: 2018-01-01

## 2018-01-01 RX ORDER — SODIUM CHLORIDE 9 MG/ML
INJECTION, SOLUTION INTRAVENOUS CONTINUOUS
Status: CANCELLED | OUTPATIENT
Start: 2018-01-01 | End: 2018-01-01

## 2018-01-01 RX ORDER — ACETAMINOPHEN 325 MG/1
650 TABLET ORAL EVERY 6 HOURS PRN
Status: DISCONTINUED | OUTPATIENT
Start: 2018-01-01 | End: 2018-01-01

## 2018-01-01 RX ORDER — TRAZODONE HYDROCHLORIDE 50 MG/1
50 TABLET ORAL NIGHTLY PRN
Qty: 30 TABLET | Refills: 5 | Status: SHIPPED | OUTPATIENT
Start: 2018-01-01

## 2018-01-01 RX ORDER — SODIUM CHLORIDE 9 MG/ML
INJECTION, SOLUTION INTRAVENOUS ONCE
Status: COMPLETED | OUTPATIENT
Start: 2018-01-01 | End: 2018-01-01

## 2018-01-01 RX ORDER — PROPRANOLOL HYDROCHLORIDE 20 MG/1
10 TABLET ORAL 2 TIMES DAILY
Status: DISCONTINUED | OUTPATIENT
Start: 2018-01-01 | End: 2018-01-01

## 2018-01-01 RX ORDER — LORAZEPAM 0.5 MG/1
0.5 TABLET ORAL 2 TIMES DAILY PRN
Status: DISCONTINUED | OUTPATIENT
Start: 2018-01-01 | End: 2018-01-01

## 2018-01-01 RX ORDER — POTASSIUM CHLORIDE 14.9 MG/ML
20 INJECTION INTRAVENOUS ONCE
Status: COMPLETED | OUTPATIENT
Start: 2018-01-01 | End: 2018-01-01

## 2018-01-01 RX ORDER — SODIUM CHLORIDE 9 MG/ML
25 INJECTION, SOLUTION INTRAVENOUS CONTINUOUS
Status: DISCONTINUED | OUTPATIENT
Start: 2018-01-01 | End: 2018-01-01

## 2018-01-01 RX ORDER — SODIUM CHLORIDE 0.9 % (FLUSH) 0.9 %
10 SYRINGE (ML) INJECTION ONCE
Status: CANCELLED | OUTPATIENT
Start: 2018-01-01

## 2018-01-01 RX ORDER — NALOXONE HYDROCHLORIDE 0.4 MG/ML
80 INJECTION, SOLUTION INTRAMUSCULAR; INTRAVENOUS; SUBCUTANEOUS AS NEEDED
Status: DISCONTINUED | OUTPATIENT
Start: 2018-01-01 | End: 2018-01-01

## 2018-01-01 RX ORDER — MAGNESIUM OXIDE 400 MG (241.3 MG MAGNESIUM) TABLET
800 TABLET ONCE
Status: COMPLETED | OUTPATIENT
Start: 2018-01-01 | End: 2018-01-01

## 2018-01-01 RX ORDER — BISACODYL 10 MG
10 SUPPOSITORY, RECTAL RECTAL
Status: DISCONTINUED | OUTPATIENT
Start: 2018-01-01 | End: 2018-01-01

## 2018-01-01 RX ORDER — PROCHLORPERAZINE MALEATE 10 MG
10 TABLET ORAL EVERY 6 HOURS PRN
Qty: 30 TABLET | Refills: 3 | Status: SHIPPED | OUTPATIENT
Start: 2018-01-01 | End: 2018-05-18

## 2018-01-01 RX ORDER — POTASSIUM CHLORIDE 29.8 MG/ML
40 INJECTION INTRAVENOUS ONCE
Status: COMPLETED | OUTPATIENT
Start: 2018-01-01 | End: 2018-01-01

## 2018-01-01 RX ORDER — FUROSEMIDE 10 MG/ML
20 INJECTION INTRAMUSCULAR; INTRAVENOUS
Status: DISCONTINUED | OUTPATIENT
Start: 2018-01-01 | End: 2018-01-01

## 2018-01-01 RX ORDER — CEFAZOLIN SODIUM/WATER 2 G/20 ML
2 SYRINGE (ML) INTRAVENOUS ONCE
Status: DISCONTINUED | OUTPATIENT
Start: 2018-01-01 | End: 2018-01-01 | Stop reason: HOSPADM

## 2018-01-01 RX ORDER — CODEINE PHOSPHATE AND GUAIFENESIN 10; 100 MG/5ML; MG/5ML
5 SOLUTION ORAL EVERY 6 HOURS PRN
Qty: 180 ML | Refills: 0 | Status: SHIPPED | OUTPATIENT
Start: 2018-01-01

## 2018-01-01 RX ORDER — HYDROCODONE BITARTRATE AND ACETAMINOPHEN 5; 325 MG/1; MG/1
1 TABLET ORAL EVERY 4 HOURS PRN
Qty: 20 TABLET | Refills: 0 | Status: ON HOLD | OUTPATIENT
Start: 2018-01-01 | End: 2018-01-01

## 2018-01-01 RX ORDER — SODIUM CHLORIDE 0.9 % (FLUSH) 0.9 %
10 SYRINGE (ML) INJECTION ONCE
Status: COMPLETED | OUTPATIENT
Start: 2018-01-01 | End: 2018-01-01

## 2018-01-01 RX ORDER — TRAZODONE HYDROCHLORIDE 50 MG/1
50 TABLET ORAL NIGHTLY PRN
Status: DISCONTINUED | OUTPATIENT
Start: 2018-01-01 | End: 2018-01-01

## 2018-01-01 RX ORDER — CYANOCOBALAMIN 1000 UG/ML
1000 INJECTION INTRAMUSCULAR; SUBCUTANEOUS ONCE
Status: CANCELLED | OUTPATIENT
Start: 2018-01-01

## 2018-01-01 RX ORDER — FUROSEMIDE 10 MG/ML
80 INJECTION INTRAMUSCULAR; INTRAVENOUS ONCE
Status: COMPLETED | OUTPATIENT
Start: 2018-01-01 | End: 2018-01-01

## 2018-01-01 RX ORDER — CODEINE PHOSPHATE AND GUAIFENESIN 10; 100 MG/5ML; MG/5ML
5 SOLUTION ORAL EVERY 4 HOURS PRN
Qty: 1 BOTTLE | Refills: 0 | Status: SHIPPED | OUTPATIENT
Start: 2018-01-01 | End: 2018-01-01

## 2018-01-01 RX ORDER — HYDROMORPHONE HYDROCHLORIDE 2 MG/1
TABLET ORAL EVERY 4 HOURS PRN
Status: DISCONTINUED | OUTPATIENT
Start: 2018-01-01 | End: 2018-01-01

## 2018-01-01 RX ORDER — FUROSEMIDE 10 MG/ML
40 INJECTION INTRAMUSCULAR; INTRAVENOUS
Status: COMPLETED | OUTPATIENT
Start: 2018-01-01 | End: 2018-01-01

## 2018-01-01 RX ORDER — SODIUM CHLORIDE 9 MG/ML
500 INJECTION, SOLUTION INTRAVENOUS ONCE
Status: COMPLETED | OUTPATIENT
Start: 2018-01-01 | End: 2018-01-01

## 2018-01-01 RX ORDER — ONDANSETRON 2 MG/ML
INJECTION INTRAMUSCULAR; INTRAVENOUS
Status: DISPENSED
Start: 2018-01-01 | End: 2018-01-01

## 2018-01-01 RX ORDER — ONDANSETRON 2 MG/ML
4 INJECTION INTRAMUSCULAR; INTRAVENOUS AS NEEDED
Status: DISCONTINUED | OUTPATIENT
Start: 2018-01-01 | End: 2018-01-01

## 2018-01-01 RX ORDER — SODIUM CHLORIDE 9 MG/ML
INJECTION, SOLUTION INTRAVENOUS CONTINUOUS
Status: DISCONTINUED | OUTPATIENT
Start: 2018-01-01 | End: 2018-01-01

## 2018-01-01 RX ORDER — ONDANSETRON 2 MG/ML
4 INJECTION INTRAMUSCULAR; INTRAVENOUS AS NEEDED
Status: DISCONTINUED | OUTPATIENT
Start: 2018-01-01 | End: 2018-01-01 | Stop reason: HOSPADM

## 2018-01-01 RX ORDER — ACETAMINOPHEN 500 MG
1000 TABLET ORAL ONCE AS NEEDED
Status: DISCONTINUED | OUTPATIENT
Start: 2018-01-01 | End: 2018-01-01

## 2018-01-01 RX ORDER — FOLIC ACID 1 MG/1
1 TABLET ORAL DAILY
Qty: 30 TABLET | Refills: 5 | Status: ON HOLD | OUTPATIENT
Start: 2018-01-01 | End: 2018-01-01

## 2018-01-01 RX ORDER — SENNA AND DOCUSATE SODIUM 50; 8.6 MG/1; MG/1
1 TABLET, FILM COATED ORAL
Qty: 20 TABLET | Refills: 0 | Status: SHIPPED | OUTPATIENT
Start: 2018-01-01

## 2018-01-01 RX ORDER — FOLIC ACID 1 MG/1
1 TABLET ORAL DAILY
Status: DISCONTINUED | OUTPATIENT
Start: 2018-01-01 | End: 2018-01-01

## 2018-01-01 RX ORDER — HYDROCODONE BITARTRATE AND ACETAMINOPHEN 5; 325 MG/1; MG/1
2 TABLET ORAL AS NEEDED
Status: DISCONTINUED | OUTPATIENT
Start: 2018-01-01 | End: 2018-01-01

## 2018-01-01 RX ORDER — DEXAMETHASONE SODIUM PHOSPHATE 4 MG/ML
4 VIAL (ML) INJECTION AS NEEDED
Status: DISCONTINUED | OUTPATIENT
Start: 2018-01-01 | End: 2018-01-01

## 2018-01-01 RX ORDER — ENOXAPARIN SODIUM 100 MG/ML
40 INJECTION SUBCUTANEOUS EVERY 24 HOURS
Status: DISCONTINUED | OUTPATIENT
Start: 2018-01-01 | End: 2018-01-01

## 2018-01-01 RX ORDER — ALBUMIN (HUMAN) 12.5 G/50ML
25 SOLUTION INTRAVENOUS
Status: COMPLETED | OUTPATIENT
Start: 2018-01-01 | End: 2018-01-01

## 2018-01-01 RX ORDER — CODEINE PHOSPHATE AND GUAIFENESIN 10; 100 MG/5ML; MG/5ML
5 SOLUTION ORAL EVERY 4 HOURS PRN
Status: DISCONTINUED | OUTPATIENT
Start: 2018-01-01 | End: 2018-01-01

## 2018-01-01 RX ORDER — CYANOCOBALAMIN 1000 UG/ML
1000 INJECTION INTRAMUSCULAR; SUBCUTANEOUS ONCE
Status: COMPLETED | OUTPATIENT
Start: 2018-01-01 | End: 2018-01-01

## 2018-01-01 RX ORDER — ALBUMIN (HUMAN) 12.5 G/50ML
25 SOLUTION INTRAVENOUS EVERY 12 HOURS
Status: COMPLETED | OUTPATIENT
Start: 2018-01-01 | End: 2018-01-01

## 2018-01-01 RX ORDER — HYDROMORPHONE HYDROCHLORIDE 1 MG/ML
0.5 INJECTION, SOLUTION INTRAMUSCULAR; INTRAVENOUS; SUBCUTANEOUS
Status: DISCONTINUED | OUTPATIENT
Start: 2018-01-01 | End: 2018-01-01

## 2018-01-01 RX ORDER — HYDROCODONE BITARTRATE AND ACETAMINOPHEN 5; 325 MG/1; MG/1
1 TABLET ORAL EVERY 4 HOURS PRN
Status: DISCONTINUED | OUTPATIENT
Start: 2018-01-01 | End: 2018-01-01

## 2018-01-01 RX ORDER — CODEINE PHOSPHATE AND GUAIFENESIN 10; 100 MG/5ML; MG/5ML
5 SOLUTION ORAL EVERY 6 HOURS PRN
Status: DISCONTINUED | OUTPATIENT
Start: 2018-01-01 | End: 2018-01-01

## 2018-01-01 RX ORDER — LORAZEPAM 2 MG/ML
INJECTION INTRAMUSCULAR EVERY 4 HOURS PRN
Status: DISCONTINUED | OUTPATIENT
Start: 2018-01-01 | End: 2018-01-01

## 2018-01-01 RX ORDER — HYDROCODONE BITARTRATE AND ACETAMINOPHEN 5; 325 MG/1; MG/1
1 TABLET ORAL AS NEEDED
Status: DISCONTINUED | OUTPATIENT
Start: 2018-01-01 | End: 2018-01-01

## 2018-01-01 RX ORDER — LIDOCAINE HYDROCHLORIDE 10 MG/ML
INJECTION, SOLUTION INFILTRATION; PERINEURAL AS NEEDED
Status: DISCONTINUED | OUTPATIENT
Start: 2018-01-01 | End: 2018-01-01 | Stop reason: HOSPADM

## 2018-01-01 RX ORDER — PREDNISONE 1 MG/1
5 TABLET ORAL DAILY
Status: DISCONTINUED | OUTPATIENT
Start: 2018-01-01 | End: 2018-01-01

## 2018-01-01 RX ORDER — BENZONATATE 200 MG/1
200 CAPSULE ORAL 3 TIMES DAILY
Status: DISCONTINUED | OUTPATIENT
Start: 2018-01-01 | End: 2018-01-01

## 2018-01-01 RX ORDER — ONDANSETRON HYDROCHLORIDE 8 MG/1
8 TABLET, FILM COATED ORAL EVERY 8 HOURS PRN
Qty: 30 TABLET | Refills: 3 | Status: SHIPPED | OUTPATIENT
Start: 2018-01-01 | End: 2018-05-18

## 2018-01-01 RX ORDER — ALBUMIN (HUMAN) 12.5 G/50ML
25 SOLUTION INTRAVENOUS ONCE
Status: COMPLETED | OUTPATIENT
Start: 2018-01-01 | End: 2018-01-01

## 2018-01-01 RX ORDER — HYDROCODONE BITARTRATE AND ACETAMINOPHEN 5; 325 MG/1; MG/1
2 TABLET ORAL EVERY 4 HOURS PRN
Status: DISCONTINUED | OUTPATIENT
Start: 2018-01-01 | End: 2018-01-01

## 2018-01-01 RX ORDER — MORPHINE SULFATE 4 MG/ML
2 INJECTION, SOLUTION INTRAMUSCULAR; INTRAVENOUS EVERY 5 MIN PRN
Status: DISCONTINUED | OUTPATIENT
Start: 2018-01-01 | End: 2018-01-01

## 2018-01-01 RX ORDER — DIPHENHYDRAMINE HCL 25 MG
25 CAPSULE ORAL EVERY 8 HOURS PRN
Status: DISCONTINUED | OUTPATIENT
Start: 2018-01-01 | End: 2018-01-01

## 2018-01-01 RX ORDER — ALBUMIN (HUMAN) 12.5 G/50ML
25 SOLUTION INTRAVENOUS EVERY 8 HOURS
Status: DISCONTINUED | OUTPATIENT
Start: 2018-01-01 | End: 2018-01-01

## 2018-01-01 RX ORDER — ONDANSETRON 2 MG/ML
4 INJECTION INTRAMUSCULAR; INTRAVENOUS EVERY 6 HOURS PRN
Status: DISCONTINUED | OUTPATIENT
Start: 2018-01-01 | End: 2018-01-01

## 2018-01-01 RX ORDER — HEPARIN SODIUM 5000 [USP'U]/ML
5000 INJECTION, SOLUTION INTRAVENOUS; SUBCUTANEOUS EVERY 8 HOURS SCHEDULED
Status: DISCONTINUED | OUTPATIENT
Start: 2018-01-01 | End: 2018-01-01

## 2018-01-01 RX ORDER — SENNA AND DOCUSATE SODIUM 50; 8.6 MG/1; MG/1
1 TABLET, FILM COATED ORAL
Status: DISCONTINUED | OUTPATIENT
Start: 2018-01-01 | End: 2018-01-01

## 2018-01-01 RX ORDER — IPRATROPIUM BROMIDE AND ALBUTEROL SULFATE 2.5; .5 MG/3ML; MG/3ML
3 SOLUTION RESPIRATORY (INHALATION) EVERY 4 HOURS PRN
Status: DISCONTINUED | OUTPATIENT
Start: 2018-01-01 | End: 2018-01-01

## 2018-01-01 RX ORDER — SIMETHICONE 80 MG
80 TABLET,CHEWABLE ORAL 4 TIMES DAILY PRN
Status: DISCONTINUED | OUTPATIENT
Start: 2018-01-01 | End: 2018-01-01

## 2018-01-01 RX ORDER — SODIUM CHLORIDE 9 MG/ML
INJECTION, SOLUTION INTRAVENOUS ONCE
Status: DISCONTINUED | OUTPATIENT
Start: 2018-01-01 | End: 2018-01-01

## 2018-01-01 RX ORDER — CEFAZOLIN SODIUM 1 G/3ML
INJECTION, POWDER, FOR SOLUTION INTRAMUSCULAR; INTRAVENOUS
Status: DISCONTINUED | OUTPATIENT
Start: 2018-01-01 | End: 2018-01-01 | Stop reason: HOSPADM

## 2018-01-01 RX ORDER — NALOXONE HYDROCHLORIDE 0.4 MG/ML
80 INJECTION, SOLUTION INTRAMUSCULAR; INTRAVENOUS; SUBCUTANEOUS AS NEEDED
Status: DISCONTINUED | OUTPATIENT
Start: 2018-01-01 | End: 2018-01-01 | Stop reason: HOSPADM

## 2018-01-01 RX ORDER — PROPRANOLOL HYDROCHLORIDE 10 MG/1
10 TABLET ORAL 2 TIMES DAILY
COMMUNITY

## 2018-01-01 RX ORDER — MAGNESIUM OXIDE 400 MG (241.3 MG MAGNESIUM) TABLET
400 TABLET ONCE
Status: COMPLETED | OUTPATIENT
Start: 2018-01-01 | End: 2018-01-01

## 2018-01-01 RX ORDER — PHYTONADIONE 10 MG/ML
10 INJECTION, EMULSION INTRAMUSCULAR; INTRAVENOUS; SUBCUTANEOUS ONCE
Status: DISCONTINUED | OUTPATIENT
Start: 2018-01-01 | End: 2018-01-01

## 2018-01-01 RX ORDER — TRAZODONE HYDROCHLORIDE 50 MG/1
50 TABLET ORAL NIGHTLY PRN
Qty: 7 TABLET | Refills: 0 | Status: SHIPPED | OUTPATIENT
Start: 2018-01-01 | End: 2018-01-01

## 2018-01-01 RX ADMIN — SODIUM CHLORIDE: 9 INJECTION, SOLUTION INTRAVENOUS at 16:07:00

## 2018-01-01 RX ADMIN — SODIUM CHLORIDE: 9 INJECTION, SOLUTION INTRAVENOUS at 15:37:00

## 2018-01-01 RX ADMIN — CODEINE PHOSPHATE AND GUAIFENESIN 5 ML: 10; 100 SOLUTION ORAL at 06:37:00

## 2018-01-01 RX ADMIN — CODEINE PHOSPHATE AND GUAIFENESIN 5 ML: 10; 100 SOLUTION ORAL at 01:53:00

## 2018-01-01 RX ADMIN — CODEINE PHOSPHATE AND GUAIFENESIN 5 ML: 10; 100 SOLUTION ORAL at 14:38:00

## 2018-01-01 RX ADMIN — BENZONATATE 200 MG: 200 CAPSULE ORAL at 09:01:00

## 2018-01-01 RX ADMIN — TRAZODONE HYDROCHLORIDE 50 MG: 50 TABLET ORAL at 21:14:00

## 2018-01-01 RX ADMIN — CODEINE PHOSPHATE AND GUAIFENESIN 5 ML: 10; 100 SOLUTION ORAL at 06:06:00

## 2018-01-01 RX ADMIN — BENZONATATE 200 MG: 200 CAPSULE ORAL at 21:00:00

## 2018-01-01 RX ADMIN — CODEINE PHOSPHATE AND GUAIFENESIN 5 ML: 10; 100 SOLUTION ORAL at 01:42:00

## 2018-01-01 RX ADMIN — BENZONATATE 200 MG: 200 CAPSULE ORAL at 20:34:00

## 2018-01-01 RX ADMIN — CYANOCOBALAMIN 1000 MCG: 1000 INJECTION INTRAMUSCULAR; SUBCUTANEOUS at 13:23:00

## 2018-01-01 RX ADMIN — CODEINE PHOSPHATE AND GUAIFENESIN 5 ML: 10; 100 SOLUTION ORAL at 16:47:00

## 2018-01-01 RX ADMIN — TRAZODONE HYDROCHLORIDE 50 MG: 50 TABLET ORAL at 21:00:00

## 2018-01-01 RX ADMIN — CODEINE PHOSPHATE AND GUAIFENESIN 5 ML: 10; 100 SOLUTION ORAL at 18:34:00

## 2018-01-01 RX ADMIN — BENZONATATE 200 MG: 200 CAPSULE ORAL at 21:13:00

## 2018-01-01 RX ADMIN — SODIUM CHLORIDE: 9 INJECTION, SOLUTION INTRAVENOUS at 09:10:00

## 2018-01-01 RX ADMIN — CODEINE PHOSPHATE AND GUAIFENESIN 5 ML: 10; 100 SOLUTION ORAL at 07:06:00

## 2018-01-01 RX ADMIN — ENOXAPARIN SODIUM 40 MG: 100 INJECTION SUBCUTANEOUS at 16:32:00

## 2018-01-01 RX ADMIN — CODEINE PHOSPHATE AND GUAIFENESIN 5 ML: 10; 100 SOLUTION ORAL at 11:40:00

## 2018-01-01 RX ADMIN — CODEINE PHOSPHATE AND GUAIFENESIN 5 ML: 10; 100 SOLUTION ORAL at 20:34:00

## 2018-01-01 RX ADMIN — SODIUM CHLORIDE 0.9 % (FLUSH) 10 ML: 0.9 % SYRINGE (ML) INJECTION at 12:38:00

## 2018-01-01 RX ADMIN — BENZONATATE 200 MG: 200 CAPSULE ORAL at 09:07:00

## 2018-01-01 RX ADMIN — CODEINE PHOSPHATE AND GUAIFENESIN 5 ML: 10; 100 SOLUTION ORAL at 06:10:00

## 2018-01-01 RX ADMIN — SODIUM CHLORIDE 0.9 % (FLUSH) 10 ML: 0.9 % SYRINGE (ML) INJECTION at 14:30:00

## 2018-01-01 RX ADMIN — CODEINE PHOSPHATE AND GUAIFENESIN 5 ML: 10; 100 SOLUTION ORAL at 10:42:00

## 2018-01-01 RX ADMIN — BENZONATATE 200 MG: 200 CAPSULE ORAL at 17:26:00

## 2018-01-01 RX ADMIN — BENZONATATE 200 MG: 200 CAPSULE ORAL at 20:28:00

## 2018-01-01 RX ADMIN — CODEINE PHOSPHATE AND GUAIFENESIN 5 ML: 10; 100 SOLUTION ORAL at 20:28:00

## 2018-01-01 RX ADMIN — HEPARIN SODIUM 5000 UNITS: 5000 INJECTION, SOLUTION INTRAVENOUS; SUBCUTANEOUS at 16:35:00

## 2018-01-01 RX ADMIN — BENZONATATE 200 MG: 200 CAPSULE ORAL at 08:41:00

## 2018-01-01 RX ADMIN — SODIUM CHLORIDE: 9 INJECTION, SOLUTION INTRAVENOUS at 06:10:00

## 2018-01-01 RX ADMIN — BENZONATATE 200 MG: 200 CAPSULE ORAL at 16:05:00

## 2018-01-01 RX ADMIN — ENOXAPARIN SODIUM 40 MG: 100 INJECTION SUBCUTANEOUS at 16:06:00

## 2018-01-01 RX ADMIN — SODIUM CHLORIDE: 9 INJECTION, SOLUTION INTRAVENOUS at 11:37:00

## 2018-01-01 RX ADMIN — BENZONATATE 200 MG: 200 CAPSULE ORAL at 16:32:00

## 2018-01-01 RX ADMIN — BENZONATATE 200 MG: 200 CAPSULE ORAL at 17:18:00

## 2018-01-01 RX ADMIN — BENZONATATE 200 MG: 200 CAPSULE ORAL at 08:37:00

## 2018-01-01 RX ADMIN — CODEINE PHOSPHATE AND GUAIFENESIN 5 ML: 10; 100 SOLUTION ORAL at 21:00:00

## 2018-01-01 RX ADMIN — CODEINE PHOSPHATE AND GUAIFENESIN 5 ML: 10; 100 SOLUTION ORAL at 16:32:00

## 2018-04-02 PROBLEM — C78.7 LIVER METASTASIS (HCC): Status: ACTIVE | Noted: 2018-01-01

## 2018-04-02 PROBLEM — C79.51 BONE METASTASIS (HCC): Status: ACTIVE | Noted: 2018-01-01

## 2018-04-06 NOTE — CONSULTS
Deep 2 Cardiology  Report of Consultation    Mariam Raphael Patient Status:  Inpatient    1960 MRN [id number]   [facility] 2NE-A Attending Mine Gates MD   Hosp Day # 0 PCP Milton Mckeon     Reason for Consultation:   Per Temp:      Wt Readings from Last 3 Encounters:  04/06/18 : 157 lb 6.5 oz (71.4 kg)  04/04/18 : 148 lb (67.1 kg)  04/02/18 : 154 lb (69.9 kg)          General: Well developed, well nourished female. Pt is in no acute distress. HEENT:   Normocephalic.   A hepatic Bx non-diagnostic per pt. 4.  Smoking Hx  5. NL LV function  6. Sinus tachycardia   -Anxious   -Volume   -Pulmonary pathology       Plan:  1. Gentle IV fluid  2. ECG  3. Oncology evaluation  4.   Monitor hemodynamics   -May require drainage of

## 2018-04-06 NOTE — PLAN OF CARE
NURSING ADMISSION NOTE      Patient admitted via Wheelchair  Oriented to room. Safety precautions initiated. Bed in low position. Call light in reach. Direct admit for abnormal echo. No reports of chest pain or discomfort.    Non-productive coug

## 2018-04-06 NOTE — CONSULTS
Pulmonary H&P/Consult       NAME: Karyle Nigh - ROOM: [facility]4437-J - MRN: [id number] - Age: 60year old - :  1960    Date of Admission: 2018 10:35 AM  Admission Diagnosis: Pericardial effusion [I31.3]  Reason for consult: cough, abnl CT    Histo reviewed. No pertinent family history. Home Medications:    Outpatient Prescriptions Marked as Taking for the 4/6/18 encounter [facility] Encounter) with [facility] CARD ECHO RM 1:  predniSONE 20 MG Oral Tab Take 4 mg by mouth daily.  50MG tapering down  Disp: EOM's intact, both eyes   Throat:   Lips, mucosa, and tongue normal; teeth and gums normal   Neck:   Supple, symmetrical, trachea midline, no adenopathy;        thyroid:  No enlargement/tenderness/nodules; no carotid    bruit or JVD   Back:     Symmetric, noted    ASSESSMENT/PLAN:    1. Cough  -suspect lingering bronchitis or lower airway irritation from externally compressing masses  -tessalon  -cheratussin  -can attempt neb treatments but would only administer if heart rate declines to 100 or less  2.  Abn

## 2018-04-06 NOTE — H&P
[facility] Hospitalist H&P       CC: No chief complaint on file. PCP: Kat Avelar    History of Present Illness:  Patient is a 60year old female with PMH sig for new found multi-organ lesions concerning for metastatic cancer of uncertain etiology.  Pt wa --   174.0   INR  1.2   --        Recent Labs   Lab  04/06/18   1218   NA  136   K  4.4   CL  101   CO2  22.0   BUN  13   CREATSERUM  0.64   GLU  97   CA  8.0*       Recent Labs   Lab  04/06/18   1218   ALT  73*   AST  173*   ALB  2.1*         Radiology: M lesion in the right paramedian cerebellar hemisphere most consistent with a solitary metastasis. A subacute infarct can produce a similar appearance, but this unlikely given the history of metastatic lung cancer.  2. Focal restricted diffusion in the cister lung cancer with lung, LN, liver, brain, and bone involvement  - has been seen by Dr. Justine Wells  - s/p liver bx on 4/4/18, which \"failed\" per pt/family   - plan was for admission for lung biopsy   - will consult pulmonary for evaluation  - addtional imaging

## 2018-04-06 NOTE — PROGRESS NOTES
Orthostatic VS       04/06/18 1129 04/06/18 1131 04/06/18 1132   Vital Signs   /79 124/84 120/81   BP Location Left arm Left arm Left arm   BP Method Automatic Automatic Automatic   Patient Position Lying Sitting Standing

## 2018-04-06 NOTE — PROGRESS NOTES
Patient here today for an outpatient echocardiogram ordered by Dr Sloane Quintero. Dr Sandro Lopez notified regarding preliminary results of echo. Dr Sandro Lopez spoke to patient and spouse, patient to be admitted. Awaiting room assignment per APN.   Patient stated understand

## 2018-04-07 NOTE — CONSULTS
[facility]  Report of Consultation    Karyle Nigh Patient Status:  Inpatient    1960 MRN [id number]   [facility] 2NE-A Attending James Raymond MD   Hosp Day # 1 PCP Kat Avelar     Reason for Consultation:  Abnormal CT c HPI.    Physical Exam:   Blood pressure 117/69, pulse 121, temperature 98 °F (36.7 °C), temperature source Oral, resp. rate 20, weight 71.4 kg (157 lb 6.5 oz), SpO2 95 %. General: Patient is alert and oriented x 3, not in acute distress. Vital Signs:  B artery to the right lower lobe. These   findings would suggest a central bronchogenic neoplasm such as small cell carcinoma. The largest sub-carinal lymph node measures approximately 2.9 x 2.4 cm.   The largest right paratracheal lymph node measures 2.4 x calcification. VASCULATURE:  Thrombus cannot be excluded without intravenous contrast.    RETROPERITONEUM:  There are scattered retroperitoneal lymph nodes, the largest is intra aortocaval measuring 1.5 x 0.9 cm.   There are small subcentimeter lymph nodes bb small metastatic lesions are previous inflammatory disease.   2.  There is extensive metastatic disease involving the majority of the right lobe of the liver and medial segment of the left lobe of the liver with more moderate changes in the lateral segme

## 2018-04-07 NOTE — PROGRESS NOTES
Pulmonary Progress Note        NAME: Dinorah Wood - ROOM: 8087/0216-Y - MRN: [id number] - Age: 60year old - : 1960        SUBJECTIVE: No events overnight, cough is much better today    OBJECTIVE:   18  0830 18  1200 18  1323  mechanical heart valve) may require more intense therapy with a therapeutic INR interval of 2.5-3.5   ----------  TSH   Date/Time Value Ref Range Status   04/06/2018 12:18 PM 0.979 0.350 - 5.500 mIU/mL Final   ----------  Albumin   Date/Time Value Ref Sandy Padron

## 2018-04-07 NOTE — PROGRESS NOTES
[facility] Cardiology/[facility]  Progress Note    Gautam Hodges Patient Status:  Inpatient    1960 MRN [id number]   [facility] 2NE-A Attending Asha Matta MD   Hosp Day # 1 PCP Rhona Mercado     Assessment:  1.   Pericardial effusion normal, no murmur, rub or gallop. pulsus   Lungs: Clear without wheezes, rales, rhonchi. Abdomen: Soft, non-tender. Extremities: Without clubbing, cyanosis or edema. Peripheral pulses present  Neurologic: Alert and oriented, normal affect.   Skin: War

## 2018-04-07 NOTE — PROGRESS NOTES
[facility] Hospitalist Progress Note                                                                   [address]  4/1/1960     SUBJECTIVE:  Denies cp or sob. No fevers or chills.   Denies light s/p liver bx on 4/4/18, which \"failed\" per pt/family               - plan was for admission for lung biopsy               - possible EBUS Monday     # mild leukocytosis  - ?suspect 2/2 steroids  - trend     # transaminitis  - likely 2/2 liver metastatic

## 2018-04-08 NOTE — PROGRESS NOTES
Pulmonary Progress Note        NAME: Jatinder Carranza - ROOM: [address] [place] [address] - MRN: [id number] - Age: 60year old - : 1960        SUBJECTIVE: No events overnight, cough a little worse today    OBJECTIVE:   18  2100 18  0018 18  0600  require more intense therapy with a therapeutic INR interval of 2.5-3.5   ----------  TSH   Date/Time Value Ref Range Status   04/06/2018 12:18 PM 0.979 0.350 - 5.500 mIU/mL Final   ----------  Albumin   Date/Time Value Ref Range Status   04/06/2018 12:18

## 2018-04-08 NOTE — PROGRESS NOTES
[facility] Cardiology/[facility]  Progress Note    Glena Offer Patient Status:  Inpatient    1960 MRN [id number]   [facility] 2NE-A Attending Rosanna Merritt MD   [facility] Day # 2 PCP Jhoan Gimenez     Assessment:  1.   Pericardial effusion carotids no bruits. Cardiac: Regular rate and rhythm, S1, S2 normal, no murmur, rub or gallop.small pulsus   Lungs: Clear without wheezes, rales, rhonchi. Abdomen: Soft, non-tender. Extremities: Without clubbing, cyanosis or edema.   Peripheral pulse

## 2018-04-08 NOTE — PROGRESS NOTES
[facility] Hospitalist Progress Note                                                                   [address]  4/1/1960     SUBJECTIVE:  Denies cp or sob. No fevers or chills.   Denies light - plan was for admission for lung biopsy               - possible EBUS Monday     # mild leukocytosis  - ?suspect 2/2 steroids  - trend     # transaminitis  - likely 2/2 liver metastatic disease     Prophy: lovenox (now dced but should be reorder

## 2018-04-08 NOTE — PROGRESS NOTES
[facility]  Progress Note    Luis Cornell Patient Status:  Inpatient    1960 MRN [id number]   [facility] 2NE-A Attending Diandra Rios MD   Hosp Day # 2 PCP Raúl Marin     Subjective:  Luis Cornell is a(n) 60year old f mg/dL 11 13   CREATININE      0.55 - 1.02 mg/dL 0.49 (L) 0.64   GFR, Non-      >=60 107 98   GFR, -American      >=60 123 113   CALCIUM      8.3 - 10.3 mg/dL 8.0 (L) 8.0 (L)   ALKALINE PHOSPHATASE      46 - 118 U/L  475 (H)   AST (SG

## 2018-04-09 NOTE — ANESTHESIA POSTPROCEDURE EVALUATION
[facility]    Lorne Solorio Patient Status:  Inpatient   Age/Gender 60year old female MRN [id number]   Location [facility] Attending Laquetta Paget, [address] [facility] Se Day # 3 PCP Hemal Nguyen       Anesthesia Post-op Note    Procedure(s):  ENDOBRO

## 2018-04-09 NOTE — PROGRESS NOTES
Pulmonary Progress Note        NAME: Chris Leblanc - ROOM: 8126/0671-T - MRN: [id number] - Age: 60year old - : 1960        SUBJECTIVE: No events overnight, still tachy but less so today    OBJECTIVE:   18  0027 18  0447 18  0856 04 require more intense therapy with a therapeutic INR interval of 2.5-3.5   ----------  TSH   Date/Time Value Ref Range Status   04/06/2018 12:18 PM 0.979 0.350 - 5.500 mIU/mL Final   ----------  Albumin   Date/Time Value Ref Range Status   04/06/2018 12:18

## 2018-04-09 NOTE — ANESTHESIA PREPROCEDURE EVALUATION
PRE-OP EVALUATION    Patient Name: Ye Fischer    Pre-op Diagnosis: INPT      Procedure(s):  ENDOBRONCHIAL ULTRASOUND      Surgeon(s) and Role:     * Marcos Lawton MD - Primary    Pre-op vitals reviewed.   Temp: 98.7 °F (37.1 °C)  Pulse: 121  Resp improvement. Negative cardiovascular ROS.     Exercise tolerance: good     MET: >4                                  (+) RYAN  (-) orthopnea  (-) PND     Endo/Other               (+) anemia                   Pulmonary  Comment: smoker               (+) recen anesthesia. Patient understands the risks and requirement for anesthesia and agrees to proceed. Consent signed and located in chart.     Su Bales MD  Anesthesiologist  Pager [phone]

## 2018-04-09 NOTE — PROGRESS NOTES
[facility]  Progress Note    Jacy Burgess Patient Status:  Inpatient    1960 MRN [id number]   [facility] 2NE-A Attending Ramya Arndt MD   Hosp Day # 3 PCP Miguel Tariq     Subjective:    Jacy Burgess is a(n) 60year old x10(3) uL 0.04 0.04   Immature Granulocyte Absolute      0.00 - 1.00 x10(3) uL 0.26 0.31   Neutrophils %      % 81.0 80.7   Lymphocytes %      % 11.5 11.2   Monocytes %      % 5.8 6.3   Eosinophils %      % 0.0 0.0   Basophils %      % 0.2 0.2   Immature G

## 2018-04-09 NOTE — PROGRESS NOTES
[facility] Cardiology  Progress Note    Ye Fischer Patient Status:  Inpatient    1960 MRN [id number]   [facility] 2NE-A Attending Suraj Galindo MD   Hosp Day # 3 PCP Maurilio Mccall     Subjective:   Resting comfortably in bed. Pulmonary:  Lungs are clear to auscultation bilaterally. There are no focal rales, rhonchi, or wheezes. Good air movement is noted throughout both lung fields. Abdomen: The abdomen is soft, non-distended, and non-tender.   Bowel sounds are present an pathology / possible URI    Plan:   1.  Echo this AM prior to EBUs - concern over degree of tachycardia. No hypotension or pulsus paradoxus noted. No orthopnea. No hypoxia. 2.  Tele monitor  3.   Pulmonary / oncology following      Angel Irene MD  4

## 2018-04-09 NOTE — OPERATIVE REPORT
Bronchoscopy procedure report    Preop diagnosis: abnl CT chest  Postop diagnosis:  Abnl CT chest  Procedure performed: TBNA under EBUS guidance    Sedation used:  General Anesthesia please see their documentation  Moderate Sedation Time: NA    Description

## 2018-04-09 NOTE — PAYOR COMM NOTE
--------------  ADMISSION REVIEW     Payor: [facility] [employer]  Subscriber #:  [id number]  Authorization Number: N/A    Admit date: 4/6/18  Admit time: 12       Admitting Physician: Asha Matta MD  Attending Physician:  Bakari Villafuerte MD  Primary Care P mood/affect      Diagnostic Data:    CBC/Chem  Recent Labs   Lab  04/04/18   0654  04/06/18   1218   WBC   --   18.8*   HGB   --   12.4   MCV   --   87.9   PLT   --   174.0   INR  1.2   --        Recent Labs   Lab  04/06/18   1218   NA  136   K  4.4   CL lesion in the right parietal calvarium (image 137, series 10; image 8, series 11).  Otherwise, calvarial bone marrow signal is normal.    IMPRESSION: 1. 12 mm enhancing lesion in the right paramedian cerebellar hemisphere most consistent with a solitary met Pericardial effusion, increasing  - apprec cardiology recommendations  - pt remains hemodynamically stable    # Metastatic cancer of uncertain etiology  - suspected metastatic lung cancer with lung, LN, liver, brain, and bone involvement  - has been seen b TFTs  6. Tele monitor      PULMONARY CONSULT  Reason for consult: cough, abnl CT     History of Present Illness: 59 y/o w/ h/o cough for the past few months presented to [facility] today from clinic for abnormal echo.   Per the pt her symptoms started in early Fe SpO2: 95% 96% 96% 93%       Oxygen Therapy  SpO2: 93 %  O2 Device: None (Room air)  Pulse Oximetry Type:  Intermittent                   Recent Labs    04/07/18 0602   WBC 17.2*   HGB 11.8*   MCV 88.7   .0           Recent Labs    04/07/18 0602 sub carinal lymph node but measures approximately 4.0 x 4.3 x 3.8 cm in the greatest transverse, AP, craniocaudad dimensions.  There is also encasement by soft tissue involving the lobar pulmonary artery to the right lower lobe.  These   findings would sug  Bilateral adrenal masses are noted.  On the left colon measuring 2.4 x 1.9 cm, on the right 1.8 x 1.0 cm. AORTA/VASCULAR:  No aneurysm or dissection.  Moderate vascular calcification.   VASCULATURE:  Thrombus cannot be excluded without intravenous contras suggest a central   bronchogenic carcinoma most likely on the right and most likely a small cell carcinoma.  Multiple tiny bilateral 2 mm nodules noted which may represent bb small metastatic lesions are previous inflammatory disease.   2. Christian Bolivar is extensi outpatient for Monday - discussed rescheduling with the pt's . Pt/ aware of concern for malignancy. Pulmonary following, anticipate proceeding with the biopsy on Monday.   Pt/ aware that tissue diagnosis to determine treatment options Date Action Dose Route User    4/9/2018 0901 Given 200 mg Oral Delta Torri, RN    4/8/2018 2113 Given 200 mg Oral Valere Libby, RN    4/8/2018 1726 Given 200 mg Oral Kayla Rivas RN      guaiFENesin-codeine (ROBITUSSIN AC) 100-10 MG/5ML syrup 5 m

## 2018-04-09 NOTE — PROGRESS NOTES
[facility] Hospitalist Progress Note                                                                   [address]  4/1/1960    Subjective:    Feels well no chest pain no SOB     Meds:   Schedule involvement  - has been seen by Dr. Leyva Bread               - plan was for admission for lung biopsy               - possible EBUS Monday pending cardiac clearance.      # mild leukocytosis  - ?suspect 2/2 steroids  - trend     # transaminitis  - likely 2/2 li

## 2018-04-10 PROBLEM — C34.91 ADENOCARCINOMA OF RIGHT LUNG (HCC): Status: ACTIVE | Noted: 2018-01-01

## 2018-04-10 NOTE — PROGRESS NOTES
[facility]    Emigdio Damon Patient Status:  Inpatient    1960 MRN [id number]   [facility] 2NE-A Attending Claudetta Freud, MD   Hosp Day # 4 PCP [address] [place] [address] has lingering cough. Has recovered after EBUS.       OBJ -f/u prn; will follow peripherally  Amol Guy MD  4/10/2018  4:05 PM

## 2018-04-10 NOTE — PROGRESS NOTES
ADDENDUM:  The patient was personally seen and examined by me. I agree with the note written below with the following comments:    S: no acute events overnight, dyspneic with exertion (after two laps around the dan). Denies LH/dizziness.  HR remains 110-1 cp  She just went on a walk today and has HR of 130s after the walk     Assessment and Plan:      Pericardial Effusion   Echo 4/6- small to moderate pericardial effusion- slight improvement from previous   Patient pulse ranging from 77-130s  Consider furth Right ventricle: The cavity size was below normal.  4. Pericardium, extracardiac: A small to moderate pericardial effusion was     identified. There was no evidence of hemodynamic compromise. No Chamber     collapse.  When compared [id number] with the elvis

## 2018-04-10 NOTE — PROGRESS NOTES
[facility]  Progress Note    Bubba Tubbs Patient Status:  Inpatient    1960 MRN [id number]   [facility] 2NE-A Attending Myles Seth MD   Hosp Day # 4 PCP Cy Gonzalez     Subjective:    Bubba Tubbs is a(n) 60year old

## 2018-04-10 NOTE — CM/SW NOTE
ARUNA met with patient and her spouse in pt's room. Patient is from home with spouse. Up until this admission she has been independent with her adls. She is not sure what she will need at d/c. She declines any social work needs at this time.      ARUNA discusse

## 2018-04-10 NOTE — PAYOR COMM NOTE
--------------  CONTINUED STAY REVIEW    Payor: [employer]  Subscriber #:  [id number]  Authorization Number: [id number]    Admit date: 4/6/18  Admit time: 12    Admitting Physician: Alexus Carson MD  Attending Physician:  MD Boom Quijano Electronically signed by Jona Medina MD at 4/10/2018  9:11 AM            MEDICATIONS ADMINISTERED IN LAST 1 DAY:  benzonatate (TESSALON) cap 200 mg     Date Action Dose Route User    4/10/2018 0841 Given 200 mg Oral Jennifer Nieto RN    4/9/2018 2100 Endobronchial ultrasound:  EBUS was used to localize the following lymph nodes: 4R. It was used to sample the following lymph nodes via needle biopsy: 4R. A pathologist was immediately present.   Atypical cells were seen        Samples obtained:

## 2018-04-10 NOTE — PROGRESS NOTES
Pt ambulated 200ft  With oxygen saturation in the 90s on room air. Tolerated well with no complains.

## 2018-04-11 NOTE — PROGRESS NOTES
Called patient w/ results of bronch. Informed  that they were consistent w/ non-small cell adenoca  Encouraged them to keep appt w/ Dr. Warner Holland today.    expressed understanding

## 2018-04-11 NOTE — PROGRESS NOTES
NURSING DISCHARGE NOTE    Discharged to home via private car  Accompanied by   Belongings taken with    Discharge instruction reviewed with pt and . Scripts were given with instruction. All questions answered. IV catheter removed intact.

## 2018-04-11 NOTE — PAYOR COMM NOTE
--------------  DISCHARGE REVIEW    Payor: [address] [place] [address] [employer]BER[employer]  Subscriber #:  [id number]  Authorization Number: [id number]    Admit date: 4/6/18  Admit time:  6437  Discharge Date: 4/10/2018  8:31 PM     Admitting Physician: Elmer Reyes MD    Primary Ca

## 2018-04-12 PROBLEM — I31.3 MALIGNANT PERICARDIAL EFFUSION (HCC): Status: ACTIVE | Noted: 2018-01-01

## 2018-04-12 PROBLEM — C79.31 SECONDARY CANCER OF BRAIN (HCC): Status: ACTIVE | Noted: 2018-01-01

## 2018-04-12 PROBLEM — C80.1 MALIGNANT PERICARDIAL EFFUSION (HCC): Status: ACTIVE | Noted: 2018-01-01

## 2018-04-12 NOTE — DISCHARGE SUMMARY
General Medicine Discharge Summary     Patient ID:  Jesus Manuel Ritchie  60year old  4/1/1960    Admit date: 4/6/2018    Discharge date and time: 4/10/2018  8:31 PM     Attending Physician: Vandana att. providers Dr. Misael Monge               - s/p EBUS on 4/9, cytology pending    - plan for PET SCAN as outpatient      #tachycardia  - suspecting 2/2 pericardial effusion + underlying malignancy   - monitor      # mild leukocytosis  - ?suspect 2/2 steroids + reactive from and day after chemo, then take 2 tabs in AM the following day., Normal, Disp-10 tablet, R-5Take 1 tab AM and PM day prior and day after chemo, then take 2 tabs in AM the following day per chemo calendar, then as directed b y physician.     folic acid 1 MG O

## 2018-04-12 NOTE — PROGRESS NOTES
Pt here for consult for metastatic lung ca. Pt started with low grade fevers, elevated heart rate and dry cough in January. Energy level has been low, getting weaker each day, SOB with exertion. Appetite is low, has to push to eat. Denies pain.  Pt had left

## 2018-04-13 NOTE — ANESTHESIA POSTPROCEDURE EVALUATION
[facility]    Nataliialucy Scriver Patient Status:  Hospital Outpatient Surgery   Age/Gender 60year old female MRN [id number]   [facility] SURGERY Attending Holden Morales MD   Hosp Day # 0 PCP Liane Salas       Anesthesia Post-op Note    Proc

## 2018-04-13 NOTE — PROGRESS NOTES
Chemotherapy Education    Learner:  Patient, Spouse and Family Member    Barriers / Limitations:  Emotional factors    Chemotherapy education goals:  · Learn the drug names  · Administration schedule  · Routes of administration  · Treatment setting    Drug menstrual irregularity and vaginal dryness:  Achieved    Notify MD/RN of any changes or problems:  Achieved    Comments:    Treatment Effects on Emotional Status:    Potential mood changes, depression, nervousness, difficulty sleeping:  Achieved    Importa increased sweating, feeling more hungry or thirsty, urinating more frequently, hair loss, feeling cold, constipation, deeper voice, muscle aches, dizziness or fainting, headache  Achieved    Kidney Problems (Nephritis): change in amount or color of urine

## 2018-04-13 NOTE — H&P
H and P notes of Dr. Julissa Carlson noted, rev'ed. Metastatic lung cancer, in need of urgent chemotherapy and, hence, port insertion. Aware of her physiologic status, case d/w Dr. Erica Baldwin relative to her recent anesthesia w bronch, and with Dr. Milena Birmingham today.

## 2018-04-13 NOTE — ANESTHESIA PREPROCEDURE EVALUATION
PRE-OP EVALUATION    Patient Name: Donavan Liu    Pre-op Diagnosis: Adenocarcinoma of right lung ([address] [C34.91]    Procedure(s):  INSERTION OF PORTACATHETER    Surgeon(s) and Role:     * Moriah Crooks MD - Primary    Pre-op vitals reviewed.   Temp: 97 °F identified. There was no evidence of hemodynamic compromise. No Chamber     collapse. When compared [id number] with the study dated 4/6/18, there     is slight improvement. Negative cardiovascular ROS.     Exercise tolerance: good     MET: >4 myocardial infarction and stroke.      Plan/risks discussed with: patient and father                Present on Admission:  **None**

## 2018-04-13 NOTE — CONSULTS
[facility]    PATIENT'S NAME: Shashank Rosado   CONSULTING PHYSICIAN: Aaliyah Campo M.D.    PATIENT ACCOUNT #: [de-identified] LOCATION: [address] [place] [address] RECORD #: [id number] YOB: 1960   CONSULTATION DATE: 04/12/2018       CANCER JUAN this was toward the end of March and ultimately was performed on March 29. This showed extensive mediastinal and hilar adenopathy; the mediastinal nodes had some calcifications.   There were right supraclavicular lymph nodes, retroperitoneal lymph nodes, g but it has not been approved yet by her insurance. The MRI of the brain showed a cerebellar lesion on the right side of the cerebellum; it is 1.2 cm in size, most consistent with a solitary metastasis.   She has a 5 mm enhancing lesion in the right parieta several years ago. Alcohol intake is rare. FAMILY MEDICAL HISTORY:  Notable for father having  of lymphoma at the age of 61. Mother  at 80 of heart issues. She has 1 brother who is diabetic and has neuropathy; he is 58years old.     REVIEW OF intact. LABORATORY DATA:  White count 16.5 yesterday, hemoglobin 12.1, platelets 362. AST is up to 238, ALT was 83, total bilirubin 1.21, alkaline phosphatase 463. IMPRESSION:  Metastatic adenocarcinoma, primary appears to be in the right lung.   The have reasonable activities in the brain, including some of the ELK inhibitors. We talked about the differences between chemotherapy, combinations of chemoimmunotherapy, and targeted therapies.   They understand that this is stage IV disease and all of thes

## 2018-04-13 NOTE — OPERATIVE REPORT
[facility] REPORT     Patient Name:  Colt Clark  MRN: [id number]    Date of Operation:  2018  Site:  [facility]    : 1960     PREOPERATIVE DIAGNOSIS: Metastatic lung cancer (stage IV)     POSTOPERATIVE DIAGNOSIS: Same    hemostatic. It was closed in 2 layers with Vicryl and Monocryl. The port was accessed through the skin with a Hendrickson needle. It freely aspirated blood and was heparinized. The port was de-accessed. A Dermabond dressing was applied.  The needle, sponge, and i

## 2018-04-13 NOTE — PROGRESS NOTES
Pt received chemo education with Renetta Murrell today and chemo consent was signed. Pt tolerated infusion well without incident or complaint. Next appointment for CLL/MD made for Tuesday 4/17 and AVS reviewed with pt and her family.  Will need to make next chemo

## 2018-04-16 NOTE — TELEPHONE ENCOUNTER
Date of Treatment: 4/13/18                                Type of Chemo: Keytruda, Alimta, Carbo    Comments: Pt called for first chemo f/u. Pt was resting. Spoke with spouse- states \"overall pt is doing fair\".  States pt had some nausea- took antiemetics

## 2018-04-17 NOTE — PROGRESS NOTES
Pt here for labs and possible treatment. OK per Dr. Nadia Koroma to give [place] today. Pt and family given written and verbal education regarding [place] Pt will RTC in [place] on Thursday for labs and an APN check.   She is quite weak, requiring oxygen

## 2018-04-17 NOTE — PATIENT INSTRUCTIONS
Pembrolizumab injection  Brand Name: Mary Mtz  What is this medicine? PEMBROLIZUMAB (pem jessica chapine mab) is a monoclonal antibody.  It is used to treat melanoma, head and neck cancer, Hodgkin lymphoma, non-small cell lung cancer, urothelial cancer, and Side effects that usually do not require medical attention (report to your doctor or health care professional if they continue or are bothersome):  · decreased appetite  · diarrhea  · tiredness  What may interact with this medicine?   Interactions have not NOTE:This sheet is a summary. It may not cover all possible information. If you have questions about this medicine, talk to your doctor, pharmacist, or health care provider.  Copyright© 2017 Elsevier        Pembrolizumab injection  Brand Name: Karyna Amel · signs and symptoms of liver injury like dark urine, light-colored stools, loss of appetite, nausea, right upper belly pain, yellowing of the eyes or skin  · stomach pain  · sweating  · weight loss  Side effects that usually do not require medical attenti Do not become pregnant while taking this medicine or for 4 months after stopping it. Women should inform their doctor if they wish to become pregnant or think they might be pregnant. There is a potential for serious side effects to an unborn child.  Talk to

## 2018-04-17 NOTE — PROGRESS NOTES
Pt seen and examined. Presented with significant dyspnea at rest and with minimal exertion. O2 sat 87% at rest.  Too weak to ambulate. Improved with oxygen to 95% and felt substantially better. Oxygen ordered at 2L/min byt NC - continuous.   CONSTANCE Rojas

## 2018-04-17 NOTE — PROGRESS NOTES
Pt seen and examined. Presented with significant dyspnea at rest and with minimal exertion. O2 sat 87% at rest.  Too weak to ambulate. Improved with oxygen to 95% and felt substantially better.   August Leija MD

## 2018-04-17 NOTE — PROGRESS NOTES
Patient is here for MD f/u post first cycle of Carbo/Alimta on Friday. Pt c/o nausea but no vomiting. Taking Compazine and Zofran around the clock since Friday. + dizziness, feels the room spinning. No appetite. Not able to tolerate solilds.  Drinking some

## 2018-04-18 NOTE — PROGRESS NOTES
Nutrition Consultation    Patient Name: Lorne Solorio  YOB: 1960  Medical Record Number: [id number]   Account Number: [de-identified]  Dietitian: Christel Reynoso RD    Date of visit: 4/17/2018    Diet Rx: high protein, calorie    Pertinent Dx/PMH written ix provided addressing - low residue, small/frequent feeds, high protein; high protein/calorie recipes; samples of Ensure Enlive, Ensure Plus and coupons provided    Assessment/Plan: CLIFTON met w/ this tired appearing, 61 y/o female, her spouse and her

## 2018-04-18 NOTE — PROGRESS NOTES
SW completed an oxygen referral to [address] yesterday. They were delivering and setting up the oxygen for patient in the evening. SW contact both [facility] and the patients  to make sure it was coordinated yesterday.  All pertinent order and paperwork w

## 2018-04-19 NOTE — PROGRESS NOTES
ANP Visit Note    Patient Name: Mariam Raphael   YOB: 1960   Medical Record Number: [id number]   CSN: [id number]   Date of visit: 4/19/2018       Chief Complaint/Reason for Visit:  Metastatic Lung Cancer, elevated LFT's     History of Present Il tablet (1 mg total) by mouth daily. , Disp: 30 tablet, Rfl: 5  •  Prochlorperazine Maleate (COMPAZINE) 10 mg tablet, Take 1 tablet (10 mg total) by mouth every 6 (six) hours as needed for Nausea., Disp: 30 tablet, Rfl: 3  •  Ondansetron HCl (ZOFRAN) 8 MG ta 70 - 99 mg/dL      Status: Final  BUN                                           Date: 04/19/2018  Value: 19          Ref range: 8 - 20 mg/dL       Status: Final  Creatinine                                    Date: 04/19/2018  Value: 0.50*       Ref range: Final  Magnesium                                     Date: 04/19/2018  Value: 2.0         Ref range: 1.8 - 2.5 mg/dL    Status: Final  WBC                                           Date: 04/19/2018  Value: 4.5         Ref range: 4.0 - 13.0 x10(3*  Status: Date: 04/19/2018  Value: 0.01        Ref range: 0.00 - 0.30 x10(*  Status: Final  Basophil Absolute                             Date: 04/19/2018  Value: 0.00        Ref range: 0.00 - 0.10 x10(*  Status: Final  Immature Granulocyte Ab

## 2018-04-21 PROBLEM — R79.89 AZOTEMIA: Status: ACTIVE | Noted: 2018-01-01

## 2018-04-21 PROBLEM — E87.1 HYPONATREMIA: Status: ACTIVE | Noted: 2018-01-01

## 2018-04-21 PROBLEM — D64.9 ANEMIA: Status: ACTIVE | Noted: 2018-01-01

## 2018-04-21 PROBLEM — I31.3 PERICARDIAL EFFUSION: Status: ACTIVE | Noted: 2018-01-01

## 2018-04-21 PROBLEM — D69.6 THROMBOCYTOPENIA (HCC): Status: ACTIVE | Noted: 2018-01-01

## 2018-04-21 PROBLEM — R50.9 FEVER: Status: ACTIVE | Noted: 2018-01-01

## 2018-04-21 PROBLEM — R73.9 HYPERGLYCEMIA: Status: ACTIVE | Noted: 2018-01-01

## 2018-04-22 PROBLEM — C79.31 BRAIN METASTASES (HCC): Status: ACTIVE | Noted: 2018-01-01

## 2018-04-22 PROBLEM — T45.1X5A ANEMIA ASSOCIATED WITH CHEMOTHERAPY: Status: ACTIVE | Noted: 2018-01-01

## 2018-04-22 PROBLEM — R50.81 FEVER IN OTHER DISEASES: Status: ACTIVE | Noted: 2018-01-01

## 2018-04-22 PROBLEM — D64.81 ANEMIA ASSOCIATED WITH CHEMOTHERAPY: Status: ACTIVE | Noted: 2018-01-01

## 2018-04-22 PROBLEM — J90 PLEURAL EFFUSION: Status: ACTIVE | Noted: 2018-01-01

## 2018-04-22 PROBLEM — R77.8 ELEVATED TROPONIN: Status: ACTIVE | Noted: 2018-01-01

## 2018-04-22 PROBLEM — R18.8 OTHER ASCITES: Status: ACTIVE | Noted: 2018-01-01

## 2018-04-22 PROBLEM — C78.00 METASTATIC LUNG CARCINOMA (HCC): Status: ACTIVE | Noted: 2018-01-01

## 2018-04-22 PROBLEM — R06.00 DYSPNEA, UNSPECIFIED TYPE: Status: ACTIVE | Noted: 2018-01-01

## 2018-04-22 PROBLEM — C34.90 PRIMARY MALIGNANT NEOPLASM OF LUNG METASTATIC TO OTHER SITE, UNSPECIFIED LATERALITY (HCC): Status: ACTIVE | Noted: 2018-01-01

## 2018-04-22 PROBLEM — C78.7 LIVER METASTASES (HCC): Status: ACTIVE | Noted: 2018-01-01

## 2018-04-22 PROBLEM — D69.6 THROMBOCYTOPENIA (HCC): Status: ACTIVE | Noted: 2018-01-01

## 2018-04-22 NOTE — PLAN OF CARE
CARDIOVASCULAR - ADULT    • Maintains optimal cardiac output and hemodynamic stability Progressing        METABOLIC/FLUID AND ELECTROLYTES - ADULT    • Electrolytes maintained within normal limits Progressing        RESPIRATORY - ADULT    • Achieves optima

## 2018-04-22 NOTE — CONSULTS
[facility]  Report of Consultation    Michelle Moore Patient Status:  Inpatient    1960 MRN [id number]   [facility] 4SW-A Attending Johana Lowery MD   Hosp Day # 0 PCP Yadira Uribe     Reason for Consultation:    Fever, tachycardia 3.375 g Intravenous Q8H   • pantoprazole (PROTONIX) IV push  40 mg Intravenous QAM AC   • folic acid  1 mg Oral Daily   • hydrocortisone Na succinate PF  50 mg Intravenous Q6H   • ondansetron HCl       • Umeclidinium Bromide  1 puff Inhalation Daily     ac scanning is performed through the chest.  Dose reduction techniques were used. Dose information is transmitted to the ACR [employer] of [employer]iology) NRDR ([address]) which includes the Dose   Index Registry.      PATIENT STATED H stable.     =====  CONCLUSION:    1.  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     This may represent metastatic dis stable compared to the recent MRI. Assessment/Plan:    # Sepsis: Cultures pending. On empiric Zosyn/vancomycin. CT done yesterday does show lung consolidation bilaterally, right greater than left. She is on stress steroid dose.     # Pericardial e

## 2018-04-22 NOTE — CONSULTS
[facility] dosing service    Initial Pharmacokinetic Consult for Vancomycin Dosing     Nereyda Kirby is a 60year old female admitted on 4/21/2018 who is being treated for sepsis.   Pharmacy has been asked to dose Vancomycin by Dr. Dixon Beaver    She is allerg follow her. We appreciate the opportunity to assist in her care.     Yi Kelly, PharmD  4/22/2018  10:57 AM  [address] [place] [address] Extension: [phone]

## 2018-04-22 NOTE — PHYSICAL THERAPY NOTE
PHYSICAL THERAPY EVALUATION - INPATIENT     Room Number: 469/469-A  Evaluation Date: 4/22/2018  Type of Evaluation: Initial  Physician Order: PT Eval and Treat    Presenting Problem: Severe sepsis from unknown source; Pleural effusion; Ascites;  Tracy Romero Personal history of antineoplastic chemotherapy        Past Surgical History  Past Surgical History:  No date:     HOME SITUATION  Type of Home: House   Home Layout: Multi-level  Stairs to Enter : 6  Railing: Yes  Stairs to Bedroom: 0       Lives strength is within functional limits except for the following:    Right Hip flexion  3+/5  Left Hip flexion  3-/5  Left Knee extension  3+/5  Left Knee flexion  3+/5    BALANCE  Static Sitting: Fair +  Dynamic Sitting: Poor +  Static Standing: Fair -  Maryanne tech    Transfers  Sit to stand = min assist  Stand to sit = min assist    Toilet t/f sit to stand = mod assist due to lower seat surface      Skilled Therapy Provided: Above functional ability scores are FIM defined scores per dept policy.      Mobility as reach;RN aware of session/findings; All patient questions and concerns addressed;SCDs in place; Family present    Eval completed. ASSESSMENT     Patient is a 60year old female admitted on 4/21/2018 for severe sepsis.    Pertinent comorbidities and persona demonstrate supine - sit EOB @ level: supervision     Goal #2 Patient is able to demonstrate transfers Sit to/from Stand at assistance level: supervision     Goal #3 Patient is able to ambulate 100 feet with assist device: walker - rolling at assistance le

## 2018-04-22 NOTE — ED INITIAL ASSESSMENT (HPI)
Hx/o pericardial effusion and adenocarcinoma new diagnosed just recently in April. First round of chemo was Friday. Here tonight due to fever which began today, weakness and dyspnea. Hx of mets to liver and brain.

## 2018-04-22 NOTE — ED PROVIDER NOTES
Patient Seen in: [facility] Emergency Department    History   Patient presents with:  Fever (infectious)  Dyspnea JENNIFER SOB (respiratory)    Stated Complaint: fever and dyspnea    HPI    Fatigue, fever, heart racing, patient recently diagnosed with ad kg   SpO2 98%   BMI 29.52 kg/m²         Physical Exam    Pale appearing woman lying on an emergency department bed  rather bedside. She appears dyspneic and slightly confused.   Her HEENT exam reveals dry mucosa her neck has JVD lungs are clear to a 51.0 (*)     RDW-SD 49.5 (*)     All other components within normal limits   CBC WITH DIFFERENTIAL WITH PLATELET    Narrative: The following orders were created for panel order CBC WITH DIFFERENTIAL WITH PLATELET.   Procedure BLOOD CULTURE   BLOOD CULTURE   MRSA CULTURE ONLY     EKG    Rate, intervals and axes as noted on EKG Report.   Rate: 137bpm  Rhythm: Sinus Rhythm  Reading: ventricular rate 137 bpm sinus tachycardia generally and diffusely low voltage abnormal EKG fluid and new bilateral pleural effusions greater on the right extent of malignancy appear similar to prior small to moderate right and trace left pleural effusions are noted new since immediate prior study dated 3/30/2018 and adjacent consolidation is not liver and pericardial sac with recent admission for pericardial effusion presents to the emergency department with a fever today after starting chemotherapy and a heart rate of 160 at home with dyspnea and weakness.   Unfortunately her platelets are 8, her 4/21/2018 Yes    Pericardial effusion I31.3 4/12/2018     Primary malignant neoplasm of lung metastatic to other site, unspecified laterality ([facility] C34.90 4/22/2018     Thrombocytopenia ([facility] D69.6 4/21/2018 Yes

## 2018-04-22 NOTE — CONSULTS
Critical Care H&P/Consult     NAME: Juan F Brochure - ROOM: [address] [place] [address] - MRN: [id number] - Age: 60year old - :  1960    Date of Admission: 2018  9:18 PM  Admission Diagnosis: Pericardial effusion [I31.3]  Thrombocytopenia ([facility] [D69.6]  Elevated t yesterday to 100.3 and HR apparently to 160 at home. Called her oncologists office who directed her to the ED. No cough, dysuria, n/v, or diarrhea. No abd or chest pain. Has a known pericardial effusion which is presumably related to the lung cancer.  HR no 400 mg Oral Once   0.9%  NaCl infusion 75 mL/hr Intravenous Continuous   [COMPLETED] Piperacillin Sod-Tazobactam So (ZOSYN) 3.375 g in dextrose 5 % 100 mL ADD-vantage 3.375 g Intravenous Once   [COMPLETED] Vancomycin HCl (VANCOCIN) 2,000 mg in sodium chlor in the last 72 hours.     Invalid input(s): [id number], CHOB  Recent Labs   Lab  04/19/18   1148  04/21/18   2137  04/22/18   0216   GLU  126*  121*  99   BUN  19  20  18   CREATSERUM  0.50*  0.53*  0.39*   GFRAA  123  120  133   GFRNAA  106  104  115   CA  7

## 2018-04-22 NOTE — PROGRESS NOTES
[facility]  Progress Note     Yolette Collins Patient Status:  Inpatient    1960 MRN [id number]   [facility] 4SW-A Attending Gretta Alanis MD   Hosp Day # 0 PCP Viraj Pollock     Chief Complaint: Fevers     S: Patient  Feels weak Imaging: Imaging data reviewed in Epic.     Medications:   • pantoprazole (PROTONIX) IV push  40 mg Intravenous QAM AC   • folic acid  1 mg Oral Daily   • hydrocortisone Na succinate PF  50 mg Intravenous Q6H   • ondansetron HCl       • Umeclidinium Bro

## 2018-04-22 NOTE — PLAN OF CARE
Received pt on 2L NC. DUE, but breathing easily at rest.  Chronic dry, nonproductive cough. Robitussin PRN. Pt states she is \"feeling better\" this AM compared to last night. 500mL IVF bolus given per intensivist.  Poor appetite, dietary consulted.    Vo

## 2018-04-22 NOTE — CONSULTS
[facility] Cardiology Consultation    Mariam Raphael Patient Status:  Inpatient    1960 MRN [id number]   [facility] 4SW-A Attending Peter Gonzalez MD   Hosp Day # 0 PCP Milton Mckeon     Reason for Consultation:  Pericardial effusion, elevated t Weights  04/21/18 2130 : 172 lb (78 kg)  04/17/18 0949 : 171 lb 8 oz (77.8 kg)  04/13/18 1426 : 168 lb 6.4 oz (76.4 kg)          General: No apparent distress  HEENT: No focal deficits. Neck: supple.  JVP normal  Cardiac: TachyRegular rhythm, S1, S2 normal

## 2018-04-22 NOTE — H&P
[facility]  History and Physical     Marisol Lopez Patient Status:  Emergency    1960 MRN [id number]   Location [address] [facility] [address] Attending Diamante Walter MD   Hosp Day # 0 PCP Ky Patterson     Chief Complaint: fevers every 4 (four) hours as needed for Pain. Disp: 20 tablet Rfl: 0   folic acid 1 MG Oral Tab Take 1 tablet (1 mg total) by mouth daily.  Disp: 30 tablet Rfl: 5   Prochlorperazine Maleate (COMPAZINE) 10 mg tablet Take 1 tablet (10 mg total) by mouth every 6 (s mood and affect.       Diagnostic Data:      Labs:  Recent Labs   Lab  04/19/18   1148  04/21/18   2137   WBC  4.5  7.5   HGB  10.6*  10.2*   MCV  88.8  86.8   PLT  31.0*  8.0*       Recent Labs   Lab  04/19/18   1148  04/21/18   2137   GLU  126*  121*   BU

## 2018-04-22 NOTE — PROGRESS NOTES
ICU  Critical Care APRN Progress Note    NAME: Ye Fischer - ROOM: [address] [place] [address] - MRN: [id number] - Age: 60year old - :1960    History Of Present Illness:  Ye Fischer is a 60year old female with PMHx significant for recent diagnosis of metastatic systems was completed. Pertinent positives and negatives noted in the HPI.     OBJECTIVE  Vitals:  /69   Pulse 112   Temp 98.2 °F (36.8 °C) (Temporal)   Resp 17   Ht 162.6 cm (5' 4\")   Wt 172 lb (78 kg)   SpO2 100%   BMI 29.52 kg/m²   Physical Exam: resolves    3. Pericardial Effusion--ongoing 2/2 #2, tachycardia stable and improved since arrival in ED  -Continue close cardiac monitoring  -Cardiology consulted  -Follow troponin  -Do not anticipate drain at this time    4.   Thrombocytopenia--2/2 chemo

## 2018-04-23 NOTE — PROGRESS NOTES
Heme/Onc Progress Note    Patient Name: Larisa Wing   YOB: 1960   Medical Record Number: [id number]   CSN: [id number]   Attending Physician: Usha Arthur M.D. Subjective:  Alert and oriented.  at bedside. Mild cough.   No N o Sodium (SENOKOT S) 8.6-50 MG tab 1 tablet, 1 tablet, Oral, Daily PRN  •  0.9%  NaCl infusion, 75 mL/hr, Intravenous, Continuous    Physical Examination:  General: Patient is alert and oriented x 3, not in acute distress.   Vital Signs: /75   Pulse 106 Value Ref Range   LACTIC ACID REFLEX Auto Resulted    -PREPARE PLATELETS   Collection Time: 04/23/18 12:30 AM   Result Value Ref Range   Blood Product [id number]    Unit Number [id number]    UNIT ABO O    UNIT RH POS    Product Status Presumed Transfuse now.     # Metastatic Lung Carcinoma: Diagnosed less than a month back. Extensive disease with liver/brain metastasis. No  mutation by tumor analysis, though quality of specimen in question. Guardant 360 should be outtoday.   Just started palliativ

## 2018-04-23 NOTE — PROGRESS NOTES
[facility] dosing service    Follow-up Pharmacokinetic Consult for Vancomycin Dosing     Felipe Essex is a 60year old female who is being treated for sepsis. Patient is on day 2 of Vancomycin 1 gm IV Q 8 hours. Goal trough is 15-20 ug/mL.     She is will need BUN/Scr daily while on Vancomycin to assess renal function. 4.  Pharmacy will follow and monitor renal function changes, toxicity and efficacy.     Nohemi Mir [facility]  4/23/2018  6:08 AM  [address] [facility] Extension: [phone]

## 2018-04-23 NOTE — PLAN OF CARE
Pt a/o x4. Fatigued. Ativan PRN for anxiety. 2L NC, RYAN. Tachypneic, but breathing easily. Robitussin PRN for chronic cough. ST on monitor, VSS. Ascites to abdomen, BM x2. Poor appetite, drinking ensure shakes. Voiding in commode. UOP WNL.   Worked wi

## 2018-04-23 NOTE — PHYSICAL THERAPY NOTE
PHYSICAL THERAPY TREATMENT NOTE - INPATIENT    Room Number: 469/469-A     Session: 1     Number of Visits to Meet Established Goals: 5    Presenting Problem: Severe sepsis from unknown source; Pleural effusion; Ascites;  Pericardial effusion    History rel unspecified type    Metastatic lung carcinoma (HCC)    Liver metastases (HCC)    Brain metastases (HCC)    Thrombocytopenia (HCC)    Pleural effusion    Other ascites    Anemia associated with chemotherapy      Past Medical History  Past Medical History: -   Need to walk in hospital room?: A Little   -   Climbing 3-5 steps with a railing?: A Lot    AM-PAC Score:  Raw Score: 17   PT Approx Degree of Impairment Score: 50.57%   Standardized Score (AM-PAC Scale): 42.13   CMS Modifier (G-Code): CK    FUNCTION status. Explained that this would be fully understandable given the huge changes she is dealing with the past 2 months. Discussed no right or wrong way of meditation exercises, and discussed deep breathing method that she is familiar with.   Pt told to no cont to benefit from skilled inpt pt to address the above problems and facilitate return to baseline. DISCHARGE RECOMMENDATIONS  PT Discharge Recommendations: Home with home health PT       PLAN  PT Treatment Plan: Bed mobility; Body mechanics; Freddie Allen

## 2018-04-23 NOTE — PROGRESS NOTES
04/23/18 1420   Clinical Encounter Type   Visited With Patient and family together   Sacramental Encounters   Sacrament of Sick-Anointing Anointed  (Father Hermes Wing provided prayer, Scripture, support and Sacrament of the [address]    to remain available

## 2018-04-23 NOTE — PAYOR COMM NOTE
--------------  ADMISSION REVIEW     Payor: [employer]  Subscriber #:  [id number]  Authorization Number: N/A    Admit date: 4/22/18  Admit time: 8763       Admitting Physician: Teodoro Pelaez MD  Attending Physician:  Syed Velasquez MD  Primary Care 162.6 cm (5' 4\")   Wt 78 kg   SpO2 98%   BMI 29.52 kg/m²          Physical Exam    Pale appearing woman lying on an emergency department bed  rather bedside. She appears dyspneic and slightly confused.   Her HEENT exam reveals dry mucosa her neck h HCT 25.6 (*)     PLT 51.0 (*)     RDW-SD 49.5 (*)      EKG    Rate, intervals and axes as noted on EKG Report.   Rate: 137bpm  Rhythm: Sinus Rhythm  Reading: ventricular rate 137 bpm sinus tachycardia generally and diffusely low voltage abnormal EKG    E fluid and new bilateral pleural effusions greater on the right extent of malignancy appear similar to prior small to moderate right and trace left pleural effusions are noted new since immediate prior study dated 3/30/2018 and adjacent consolidation is not liver and pericardial sac with recent admission for pericardial effusion presents to the emergency department with a fever today after starting chemotherapy and a heart rate of 160 at home with dyspnea and weakness.   Unfortunately her platelets are 8, her [facility]  History and Physical     Nereyda Kirby Patient Status:  Emergency    1960 MRN [id number]   Location [facility] Attending Osbaldo Munoz MD   Hosp Day # 0 PCP Luciano Bullock     Chief Complaint: f Take 1 tablet (10 mg total) by mouth every 6 (six) hours as needed for Nausea. Disp: 30 tablet Rfl: 3   Ondansetron HCl (ZOFRAN) 8 MG tablet Take 1 tablet (8 mg total) by mouth every 8 (eight) hours as needed for Nausea.  Disp: 30 tablet Rfl: 3   LORazepam 04/19/18   1148  04/21/18   2137   GLU  126*  121*   BUN  19  20   CREATSERUM  0.50*  0.53*   GFRAA  123  120   GFRNAA  106  104   CA  7.4*  8.1*   ALB  1.7*  1.7*   NA  133*  135*   K  4.0  4.4   CL  98*  100*   CO2  25.0  26.0   ALKPHO  566*  680*   AST cultures and narrow as able  - started on stress dose steroids, would taper rapidly  2. Pericardial effusion, trop elevation - tachcyardia improved with IVF and she is hemodynamically stable  - trend troponin, no cp  - appreciate cards recs, d/w cards  3. right greater than left. She is on stress steroid dose.     # Pericardial effusion: Seen by cardiology. Thought to be stable. Follow for now.     # Metastatic Lung Carcinoma: Diagnosed less than a month back.   Extensive disease with liver/brain metastasi LAST 1 DAY:  Albumin Human (ALBUMINAR) 25 % solution 25 g     Date Action Dose Route User    4/22/2018 0416 New Bag 25 g Intravenous Marga [place], BEBE      folic acid (FOLVITE) tab 1 mg     Date Action Dose Route User    4/23/2018 0737 Given 1 mg Simone Pleitez Umeclidinium Bromide (INCRUSE ELLIPTA) 62.5 MCG/INH inhaler 1 puff     Date Action Dose Route User    4/22/2018 1544 Given 1 puff Inhalation Macario Reeves RN      Vancomycin HCl (VANCOCIN) 1,000 mg in sodium chloride 0.9 % 250 mL IVPB add-vantage     D

## 2018-04-23 NOTE — PROGRESS NOTES
Emigdio Damon Patient Status:  Inpatient    1960 MRN [id number]   [facility] 4SW-A Attending Jane Guillen MD   Hosp Day # 1 PCP [facility] Progress Note      Assessment/Plan:  1.  Sepsis - looking like the source Mallampati 3   Lungs: clear   Chest wall: No tenderness or deformity. Heart: Regular rate and rhythm, normal S1S2, no  murmur. Abdomen: soft, non-tender, non-distended, positive BS. Extremity: No clubbing or cyanosis mild edema.    Skin: No rashes or

## 2018-04-23 NOTE — PROGRESS NOTES
[facility] Cardiology  Progress Note    Jatinder Carranza Patient Status:  Inpatient    1960 MRN [id number]   [facility] 4SW-A Attending Arlen Aviles MD   Hosp Day # 1 PCP Chiquis Paredes     Primary cardiologist: Dale Blackmon MD Facility-Administered Medications:  Vancomycin HCl (VANCOCIN) 1,250 mg in sodium chloride 0.9 % 500 mL IVPB 1,250 mg Intravenous Q8H   acetaminophen (TYLENOL) tab 650 mg 650 mg Oral Q6H PRN   HYDROcodone-acetaminophen (NORCO) 5-325 MG per tab 1 tablet 1 ta

## 2018-04-24 NOTE — PHYSICAL THERAPY NOTE
PHYSICAL THERAPY TREATMENT NOTE - INPATIENT    Room Number: 405/405-A     Session: 1   Number of Visits to Meet Established Goals: 5    Presenting Problem: Severe sepsis from unknown source; Pleural effusion; Ascites;  Pericardial effusion     History rela eye)    WEIGHT BEARING RESTRICTION  Weight Bearing Restriction: None                PAIN ASSESSMENT   Ratin  Location: denies       BALANCE and ~15' to walk to BR. Pt agreeable to t/f to commode for toileting, pt refusing to attempt gait training c RW . Encouraged pt to perform sup therex, pt reports she has handout and will perform. HR at rest noted at 131, RN aware.   Pt left in bed, RUEL stevens and min assist.   Goal #5 Compliance with activity recommendations.      Goal #6     Goal Comments: Goals established on 4/22/2018  All goals ongoing

## 2018-04-24 NOTE — PLAN OF CARE
Patient alert and oriented, but fatigued/drowsy this morning. Patient denies pain. Patient on 2L via NC and does wear home oxygen as well. Patient with non-productive, dry cough. Patient on telemetry and running ST; Patient with run of SVT this morning.   Achieve highest/safest level of mobility/gait Progressing        METABOLIC/FLUID AND ELECTROLYTES - ADULT    • Electrolytes maintained within normal limits Progressing        RESPIRATORY - ADULT    • Achieves optimal ventilation and oxygenation Progressing

## 2018-04-24 NOTE — PROGRESS NOTES
Pulmonary Progress Note     Assessment / Plan:  1. Sepsis - looking like the source is extensive tumor lysis  - s/p IVF resusciation  - lactate normal now  - continue zosyn. DC vanc  - f/u cultures  2.  Pericardial effusion, trop elevation - tachcyardia imp

## 2018-04-24 NOTE — PROGRESS NOTES
[facility]    PATIENT'S NAME: Keira Joe   ATTENDING PHYSICIAN: Usha Arthur M.D.    PATIENT ACCOUNT #: [de-identified] LOCATION: [address] [place] [address] RECORD #: [id number] YOB: 1960   DATE OF SERVICE: 04/17/2018       [facility] p.r.n., hydrocodone 5/325 one tablet q.4 h. p.r.n., lorazepam 0.5 mg twice daily p.r.n., ondansetron 8 mg q.8 h. p.r.n., prednisone on a tapering scale, which is down to 20 mg now, prochlorperazine 10 mg q.6 h. p.r.n., senna with docusate 1 tablet daily p. looked at. Dictated By Cande Shankar M.D.  d: 04/23/2018 14:52:08  t: 04/24/2018 06:10:23  [facility] [id number]  X/    cc: Stefania Miner.  MD Argelia Hendricks MD Dr. Valli Boos Dr. Sanjuan Marin

## 2018-04-24 NOTE — HOME CARE LIAISON
DIAGNOSES AND PERTINENT MEDICAL HISTORY: PERCARDIAL EFFUSION, LUNG CA W/ METS    FACILITY NAME AND DC DATE: [facility]    BEDSIDE VISIT WITH: REFERRAL RECEIVED FROM ALFRED BERMAN. MET WITH PATIENT'S .  SPOUSE IS AGREEABLE TO [address]

## 2018-04-24 NOTE — PROGRESS NOTES
Heme/Onc Progress Note    Patient Name: Gautam Hodges   YOB: 1960   Medical Record Number: [id number]   CSN: [id number]   Attending Physician: Haroldo Azul M.D. Subjective:  Swollen. Hands, legs, face and abdomen.   Breathing still ten PRN  •  0.9%  NaCl infusion, 25 mL/hr, Intravenous, Continuous    Physical Examination:  General: Patient is alert and oriented x 3, not in acute distress.   Vital Signs: /76 (BP Location: Left arm)   Pulse 120   Temp 98.2 °F (36.8 °C) (Oral)   Resp 2 Albumin 1.7 (L) 3.5 - 4.8 g/dL   Sodium 140 136 - 144 mmol/L   Potassium 3.5 (L) 3.6 - 5.1 mmol/L   Chloride 107 101 - 111 mmol/L   CO2 22.0 22.0 - 32.0 mmol/L   -LACTIC ACID, PLASMA   Collection Time: 04/24/18  5:09 AM   Result Value Ref Range   Lactic analysis, though quality of specimen in question.   Guardant 360 fails to demonstrate actionable mutation.  Just started palliative chemo-immunotherapy with carboplatin/pemetrexed/Pembrolizumab on 4/13/18.  LDH drop may be first sign of response but still e

## 2018-04-24 NOTE — PLAN OF CARE
0530  Received patient into 405. Tele applied and oriented to floor. She stated \" she was still tired and wanted to go back to sleep\". IV  Antibiotics infusing as prescribed.  at bedside.

## 2018-04-24 NOTE — CM/SW NOTE
04/24/18 1300   CM/SW Screening   Referral Source    Information Source Chart review;Nursing rounds   Patient's Mental Status Alert;Oriented   Patient's [address]   Patient lives with Spouse   Patient Status Prior to Admission

## 2018-04-24 NOTE — PROGRESS NOTES
[facility] Hospitalist Progress Note                                                                   [address]  4/1/1960    CC: follow up    SUBJECTIVE:  Pt laying in bed, asleep.  Brother Umeclidinium Bromide  1 puff Inhalation Daily   • piperacillin-tazobactam  3.375 g Intravenous Q8H     Continuous Infusions:   • sodium chloride 25 mL/hr (04/24/18 0709)     PRN: HYDROmorphone HCl, LORazepam, HYDROmorphone HCl, LORazepam, ondansetron HCl, monitor      Prophylaxis:   DVT with SCD's, no ppx anticoagulation given thrombocytopenia    D/w Pt and family at bedside who expressed understanding         Thank Veronica Henley MD    [facility] Hospitalist  Answering Service number: [phone]

## 2018-04-24 NOTE — PROGRESS NOTES
[facility] Card[facility]  Progress Note    Marisol Lopez Patient Status:  Inpatient    1960 MRN [id number]   [facility] 4SW-A Attending Xiomara Upton MD   Hosp Day # 2 PCP Ky Patterson     Primary cardiologist: Tino Krause MD Medications:  predniSONE (DELTASONE) tab 5 mg 5 mg Oral Daily   HYDROmorphone HCl (DILAUDID) tab 2-4 mg 2-4 mg Oral Q4H PRN   Vancomycin HCl (VANCOCIN) 1,250 mg in sodium chloride 0.9 % 500 mL IVPB 1,250 mg Intravenous Q8H   HYDROmorphone HCl (DILAUDID) 1

## 2018-04-24 NOTE — OCCUPATIONAL THERAPY NOTE
Attempted to see pt for OT. Pt refusing OT at this time. Family present and stating \"she just doesn't feel good today. \" Family with questions RE: 3 in 1 commode for home. Pt/family agreeable to re-attempt for OT eval 4/25.  RN aware

## 2018-04-24 NOTE — PLAN OF CARE
CARDIOVASCULAR - ADULT    • Maintains optimal cardiac output and hemodynamic stability Progressing        GASTROINTESTINAL - ADULT    • Maintains adequate nutritional intake (undernourished) Progressing        HEMATOLOGIC - ADULT    • Maintains hematologic

## 2018-04-25 NOTE — PLAN OF CARE
GASTROINTESTINAL - ADULT    • Maintains adequate nutritional intake (undernourished) Not Progressing          CARDIOVASCULAR - ADULT    • Maintains optimal cardiac output and hemodynamic stability Progressing          RESPIRATORY - ADULT    • Achieves opti

## 2018-04-25 NOTE — PROGRESS NOTES
[facility] Cardiology  Progress Note    Vinetta Settler Patient Status:  Inpatient    1960 MRN [id number]   [facility] 4SW-A Attending Grace Hawkins MD   Hosp Day # 3 PCP Landen Sauceda     Primary cardiologist: Sarah Fleming MD Temp (24hrs), Av.1 °F (36.7 °C), Min:97.5 °F (36.4 °C), Max:98.8 °F (37.1 °C)      Intake/Output Summary (Last 24 hours) at 18 0850  Last data filed at 18 0600   Gross per 24 hour   Intake             1305 ml   Output              700 ml 8.6-50 MG tab 1 tablet 1 tablet Oral Daily PRN   0.9%  NaCl infusion 25 mL/hr Intravenous Continuous       Laboratory Data:    Lab Results  Component Value Date   WBC 8.5 04/25/2018   HGB 7.9 04/25/2018   HCT 24.5 04/25/2018   PLT 11.0 04/25/2018       Lab

## 2018-04-25 NOTE — PROGRESS NOTES
Received patient this afternoon from oncology floor in stable condition.   ST on tele rates 120-130  2l nc 92% saturation  Lasix gtt through port-a-cath  IV abx and albumin as ordered through peripheral lines  Jolley draining yellow urine to gravity    Patie

## 2018-04-25 NOTE — PLAN OF CARE
Patient alert and oriented x4, however she is fatigued and drowsy. Patient is easily arouseable. Patient denies pain this morning.  Patient seen by Dr. Morenita Trejo this morning and plan for a couple of doses of IV lasix to get some fluid off patient as she is st transfer. Patient's  notified by brother-in-law at bedside regarding transfer. 1300: Report given to [facility], RN, on [address]. Patient to transfer to room 8615. Family at bedside notified of new room assignement.     1415: Patient transferred to room 86

## 2018-04-25 NOTE — PROGRESS NOTES
[facility] Hospitalist Progress Note                                                                   [address]  4/1/1960    CC: follow up    SUBJECTIVE:  Pt laying in bed, brother at be mg Oral Daily   • Umeclidinium Bromide  1 puff Inhalation Daily   • piperacillin-tazobactam  3.375 g Intravenous Q8H     Continuous Infusions:   • furosemide (LASIX) 1 mg/ml standard infusion 10 mg/hr (04/25/18 1137)     PRN: HYDROmorphone HCl, LORazepam, onc    Hypoalbuminemia contributing to anasarca  -IV lasix gtt, IV albumin  -nephrology on consult, appreciate  -strict I/o    Hypokalemia-replete prn, monitor      Prophylaxis:   DVT with SCD's, no ppx anticoagulation given thrombocytopenia    D/w Pt and

## 2018-04-25 NOTE — PAYOR COMM NOTE
--------------  CONTINUED STAY REVIEW    Payor: [employer] CAMPO/NATO  Subscriber #:  [id number]  Authorization Number: [id number]    Admit date: 4/22/18  Admit time: Jose Johnston 1527    Admitting Physician: Sebastian Mcneal MD  Attending Physician:  Anabel Monsalve, Absolute 0.99 0.90 - 4.00 x10(3) uL   Monocyte Absolute 0.62 0.10 - 1.00 x10(3) uL   Eosinophil Absolute 0.00 0.00 - 0.30 x10(3) uL   Basophil Absolute 0.01 0.00 - 0.10 x10(3) uL   Immature Granulocyte Absolute 0.03 0.00 - 1.00 x10(3) uL             CM/SW proceed.      4) Brain metastases: Will need radiation once has achieved some degree of systemic control.  Mental status appears baseline at this time with episodes of confusion related to meds.  CT done in ER does not show any evidence of bleed but there i Yasmani Terrell RN    4/24/2018 1104 New Bag 3.375 g Intravenous Kiara Lr RN      0.9%  NaCl infusion     Date Action Dose Route User    4/25/2018 0600 Rate/Dose Verify 25 mL/hr Intravenous Igor Barnett RN          APPROVED TO 4/24.  PLEASE FAX ADDIT

## 2018-04-25 NOTE — PHYSICAL THERAPY NOTE
IP PT attempted, pt refusing 2/2 fatigue. Pt somnolent with family member present. Per RN, pt to bed t/f SWETHA.

## 2018-04-25 NOTE — CONSULTS
[facility]  Report of Consultation    Emigdio Damon Patient Status:  Inpatient    1960 MRN [id number]   [facility] 4NW-A Attending Rayo Campbell, *   Hosp Day # 3 PCP Adria Goodman     Reason for Consultation:  Sagar Ivy tab 2-4 mg, 2-4 mg, Oral, Q4H PRN  •  LORazepam (ATIVAN) injection 0.5-1 mg, 0.5-1 mg, Intravenous, Q4H PRN  •  HYDROmorphone HCl (DILAUDID) 1 MG/ML injection 0.5 mg, 0.5 mg, Intravenous, Q1H PRN  •  Pantoprazole Sodium (PROTONIX) 40 mg in Sodium Chloride oz  04/13/18 0901 : 166 lb 12.5 oz  04/12/18 0905 : 154 lb  04/12/18 1544 : 168 lb 3.2 oz    General: Alert and weak in no apparent distress.   HEENT: No scleral icterus, MMM, + periorbital edema  Neck: Supple  Cardiac: tachy, regular, S1, S2 normal, no mur 1. 9*  1.7*  1.6*   LDH  2,533*   --   2,985*       No results for input(s): PGLU in the last 72 hours. Imaging:  CT noted    Impression/Plan:    #1.  Anasarca- in setting of severe hypoalbuminemia with liver mets, poor po intake and has had sig wt ga

## 2018-04-25 NOTE — PROGRESS NOTES
[facility]    Larisa Wing Patient Status:  Inpatient    1960 MRN [id number]   [facility] 4NW-A Attending Felipe Gaytan, *   Hosp Day # 3 PCP Jacqui Mejia / Critical Care Progress Note     S: Pt states that she f 0612   WBC  5.5  6.5  8.5   HGB  7.8*  8.3*  7.9*   HCT  24.6*  25.5*  24.5*   PLT  32.0*  21.0*  11.0*     No results for input(s): INR in the last 72 hours.       Recent Labs   Lab  04/23/18   0605  04/24/18   0509  04/25/18   0612   NA  137  140  140   K

## 2018-04-25 NOTE — PROGRESS NOTES
Heme/Onc Progress Note    Patient Name: Shiela Talley   YOB: 1960   Medical Record Number: [id number]   CSN: [id number]   Attending Physician: Remington Robison M.D.      Subjective:  Having periodic dyspnea and panic episodes which have been in Q8H  •  TraZODone HCl (DESYREL) tab 50 mg, 50 mg, Oral, Nightly PRN  •  Senna-Docusate Sodium (SENOKOT S) 8.6-50 MG tab 1 tablet, 1 tablet, Oral, Daily PRN  •  0.9%  NaCl infusion, 25 mL/hr, Intravenous, Continuous    Physical Examination:  General: Wilmar g/dL   RDW 16.4 (H) 11.5 - 16.0 %   RDW-SD 51.8 (H) 35.1 - 46.3 fL   Neutrophil Absolute Prelim 4.83 1.30 - 6.70 x10 (3) uL   Neutrophil Absolute 4.83 1.30 - 6.70 x10(3) uL   Lymphocyte Absolute 0.99 0.90 - 4.00 x10(3) uL   Monocyte Absolute 0.62 0.10 - 1.  Small, watch for now.     9)  Fluid Overload:  Back off on IV fluid yestrday. Start diuretic and will ask renal to assist in mobilizing fluid.     Roshan Bocanegra MD  [facility]  [facility]  [facility] [place]

## 2018-04-26 PROBLEM — D68.9 COAGULOPATHY (HCC): Status: ACTIVE | Noted: 2018-01-01

## 2018-04-26 PROBLEM — E87.6 HYPOKALEMIA: Status: ACTIVE | Noted: 2018-01-01

## 2018-04-26 NOTE — OCCUPATIONAL THERAPY NOTE
Pt transferred from room 405 to room 8615 on 4/25/18 due to for lower patient ratio and possible need for heart drip and now diuresis. Pt will need new orders to resume OT services.      Thank you for allowing me to care for this patient,   Sarah Gonzales

## 2018-04-26 NOTE — PROGRESS NOTES
Heme/Onc Progress Note    Patient Name: Vincent Barr   YOB: 1960   Medical Record Number: [id number]   CSN: [id number]   Attending Physician: Yvonne Meng M.D. Subjective:  Weak. No major pain issues. Back is stiff from immobility. Intravenous, Q6H PRN  •  guaiFENesin-codeine (ROBITUSSIN AC) 100-10 MG/5ML syrup 5 mL, 5 mL, Oral, Q6H PRN  •  Umeclidinium Bromide (INCRUSE ELLIPTA) 62.5 MCG/INH inhaler 1 puff, 1 puff, Inhalation, Daily  •  Piperacillin Sod-Tazobactam So (ZOSYN) 3.375 g Presumed Transfused    Expiration Date [id number]    Blood Type Barcode 2927    -COMP METABOLIC PANEL (14)   Collection Time: 04/26/18  5:12 AM   Result Value Ref Range   Glucose 98 70 - 99 mg/dL   BUN 18 8 - 20 mg/dL   Creatinine 0.60 0.55 - 1.02 mg/dL reviewed,normal RBC and platelet morphology.     -PREPARE PLATELETS   Collection Time: 04/26/18  7:28 AM   Result Value Ref Range   Blood Product [id number]    Unit Number [id number]    UNIT ABO O    UNIT RH POS    Product Status Cross Matched    Expirat following. 10) hypokalemia - replete    11)  Coagulopathy - due to poor PO intake and liver dysfunction. FFP today for procedure. MD Robbie Nieves [facility]  [address]

## 2018-04-26 NOTE — DIETARY NOTE
NUTRITION INITIAL ASSESSMENT    Pt is at moderate nutrition risk. Pt does not meet malnutrition criteria.     NUTRITION DIAGNOSIS/PROBLEM:    Inadequate energy intake related to inability to consume sufficient energy as evidenced by estimated intake less th Mass: BMI 32.77     2.  Fluid Accumulation: anasarca    NUTRITION PRESCRIPTION:  Calories: 4323-3971 calories/day (25-30 calories per kg UBW)  Protein:  grams protein/day (1.2-1.5 grams protein per kg)  Fluid: ~1 ml/kcal or per MD discretion    [facility]

## 2018-04-26 NOTE — PROGRESS NOTES
[facility]  Progress Note    Luis Cornell Patient Status:  Inpatient    1960 MRN [id number]   [facility] 4SW-A Attending Samira Baker MD   Hosp Day # 4 PCP Raúl Marin     Primary cardiologist: Laure Garcia MD : 171 lb 8 oz (77.8 kg)  04/13/18 : 168 lb 6.4 oz (76.4 kg)      General: drowsy, arousable. in no acute distress. appears ill.   Cardiac: Regular tachycardia, no murmurs/rubs/gallops are appreciated  Lungs: Clear to auscultation bilaterally; no accessory 04/26/2018       Lab Results  Component Value Date   INR 1.86 (H) 04/26/2018   INR 1.43 (H) 04/22/2018   INR 1.2 04/04/2018       Lab Results  Component Value Date    04/26/2018   K 2.3 04/26/2018    04/26/2018   CO2 28.0 04/26/2018   BUN 18 04

## 2018-04-26 NOTE — PROGRESS NOTES
[facility] Hospitalist Progress Note     PCP: Walker LEON    SUBJECTIVE:  No CP, SOB, or N/V.  + lower abdominal pain due to constipation. She is requesting suppository.     OBJECTIVE:  Temp:  [98.7 °F (37.1 °C)-99.9 °F (37.7 °C)] 98.7 °F (37.1 °C)  Pulse:  [1 TROP in the last 72 hours. Imaging:  Echo - 1. Left ventricle: The cavity size was below normal. Systolic function was     normal. The estimated ejection fraction was 65-70%. No diagnostic     evidence for regional wall motion abnormalities.   2. Right v elevated lactic acid   -On empiric  zosyn (s/p vanc) for possible PNA, PCT normal.   -F/u cultures  -prednisone per pulm     Metastatic Lung carcinoma  -Extensive disease with liver/brain metastasis  -Appreciate heme-onc ocnsultant  -Started palliative luis

## 2018-04-26 NOTE — OCCUPATIONAL THERAPY NOTE
Spoke with RN, [place]. RN was looking into placing new orders and states that patient is not appropriate to participate at this time. Please re-consult if new needs arise.     MURTAZA Keys, OTR/L  Master of Occupational Therapy  Occupational Ther

## 2018-04-26 NOTE — PAYOR COMM NOTE
--------------  CONTINUED STAY REVIEW    Payor: [employer]PO/[employer]O  Subscriber #:  [id number]  Authorization Number: [id number]    Admit date: 4/22/18  Admit time: Jose Johnston 1527    Admitting Physician: Afia Guerrero MD  Attending Physician:  Breonna Kim DO  Prim 7. 1   HGB  7.8*  8.3*  7.9*   --   7.0*   MCV  90.1  89.8  88.4   --   88.8   PLT  32.0*  21.0*  11.0*  62.0*  41.0*   INR   --    --    --    --   1.86*               Recent Labs   Lab  04/24/18   0509  04/25/18   0612  04/26/18   0512   NA  140  140  14 Latisha Zambrano RN    4/25/2018 2300 Rate/Dose Verify 10 mg/hr Intravenous Santo Manuel RN      HYDROmorphone HCl (DILAUDID) 1 MG/ML injection 0.5 mg     Date Action Dose Route User    4/26/2018 8957 Given 0.5 mg Intravenous BEBE Rodriguez

## 2018-04-26 NOTE — PROGRESS NOTES
Dr. Morenita Hernandez made aware of plt level (41.0), also, Dr. Morenita Hernandez spoke on the phone with Dr. Christopher Grossman (cardiologist). Per Dr. Morenita Hernandez, he will order 1 unit of RBC transfusion, 1 unit of plt transfusion. Dr. Morenita Hernandez also ordered Kcl 40 meq IV.      Dr. Bianka schmidt r

## 2018-04-26 NOTE — PROGRESS NOTES
[facility]  Nephrology Progress Note    Shiela Talley Patient Status:  Inpatient    1960 MRN [id number]   [facility] 8NE-A Attending Mich Kaye, DO   Hosp Day # 4 PCP Chivo Cronin       SUBJECTIVE:  Very sluggish/weak today, UOP 101*  98       Recent Labs   Lab  04/24/18   0509  04/25/18   0612  04/26/18   0512   ALT  82*  99*  117*   AST  208*  278*  375*   ALB  1.7*  1.6*  2.9*   LDH   --   2,985*  2,896*       No results for input(s): PGLU in the last 72 hours.     Meds:     Cu following     #3. pericardial effusion/tachycardia- repeat echo noted. Cards following with poss pericardiocentesis tomorrow. #4. Hypokalemia- related to aggressive diuresis.   Replace and follow        Questions/concerns were discussed with patient an

## 2018-04-26 NOTE — PROGRESS NOTES
Pulmonary Progress Note        NAME: Yfn Settler - ROOM: 9034/7105-S - MRN: [id number] - Age: 60year old - : 1960        SUBJECTIVE: No events overnight, very fatigued    OBJECTIVE:   18  0754 18  1034 18  1045 18  1109   B 2.3*    108 100*   CO2 22.0 22.0 28.0   BUN 15 16 18   CA 7.6* 7.8* 8.1*   MG  --  2.0  --          Recent Labs   04/24/18  0509 04/25/18  0612 04/26/18  0512   ALT 82* 99* 117*   * 278* 375*   ALB 1.7* 1.6* 2.9*   LDH  --  2,985* 2,896*

## 2018-04-26 NOTE — PHYSICAL THERAPY NOTE
Pt transferred from [facility] per chart - Pt will require new orders when appropriate and agreeeable to participate in PT/OT Eval.  RN made aware.

## 2018-04-27 NOTE — PLAN OF CARE
Pt has flat effect- withdrawn- and drowsy. Pt is orientated x3.  is at bedside ATC. Potassium replaced per Dr. Anjel Delgado this am for K level of 2.0.  40 IV K rider given.  Per Dr. Anil Williamson give another 39meq K rider after this one is completed and

## 2018-04-27 NOTE — PROGRESS NOTES
[facility] Hospitalist Progress Note     PCP: Radha LEON    SUBJECTIVE:  No CP, SOB, or N/V.    OBJECTIVE:  Temp:  [98.1 °F (36.7 °C)-99.7 °F (37.6 °C)] 98.7 °F (37.1 °C)  Pulse:  [] 123  Resp:  [16-22] 16  BP: (115-134)/(65-78) 128/67    Intake/Output: input(s): PGLU in the last 72 hours. No results for input(s): TROP in the last 72 hours.         Meds:     • potassium chloride  40 mEq Intravenous Once   • sodium chloride   Intravenous Once   • predniSONE  5 mg Oral Daily   • pantoprazole (PROTONIX) IV decline may also have some component of dilution  -follow CBC  -transfuse for hemoglobin <7  -received platelet transfusion 4/23/18  -PRBCs, platelets and FFP today     Transaminitis and liver dysfunction  -secondary to liver mets above  -avoiding tylenol

## 2018-04-27 NOTE — PROGRESS NOTES
Pulmonary Progress Note        NAME: Gautam Hodges - ROOM: 8440/3719-T - MRN: [id number] - Age: 60year old - : 1960        SUBJECTIVE: No events overnight, a little more alert today, seems to have a bit more energy    OBJECTIVE:   18  0437  18   CA 7.6* 7.8* 8.1*   MG  --  2.0  --          Recent Labs   04/24/18  0509 04/25/18  0612 04/26/18  0512   ALT 82* 99* 117*   * 278* 375*   ALB 1.7* 1.6* 2.9*   LDH  --  2,985* 2,896*       Invalid input(s): Jannette Lockhart, ARTERIALPCO2 Andie PATTERSONG Pulmonary and Critical Care

## 2018-04-27 NOTE — PLAN OF CARE
Problem: CARDIOVASCULAR - ADULT  Goal: Maintains optimal cardiac output and hemodynamic stability  INTERVENTIONS:  - Monitor vital signs, rhythm, and trends  - Monitor for bleeding, hypotension and signs of decreased cardiac output  - Evaluate effectivenes and symptoms of bleeding or hemorrhage  - Monitor labs and vital signs for trends  - Administer supportive blood products/factors, fluids and medications as ordered and appropriate  - Administer supportive blood products/factors as ordered and appropriate

## 2018-04-27 NOTE — PLAN OF CARE
Assumed patient care at 1500. Patient a&ox4, drowsy, flat affect. VSS- Low-grade fevers- trending- patient cannot have tylenol. ST on tele. 2LNC, . No c/o pain at this time- PRN pain meds available.  Jolley draining to gravity, bed pan for bowel movements

## 2018-04-27 NOTE — PROGRESS NOTES
[address] [facility] Card[facility]  Progress Note    Mary Tyler Patient Status:  Inpatient    1960 MRN [id number]   [facility] 4SW-A Attending Trevon Conley MD   Hosp Day # 5 PCP [facility]     Primary cardiologist: Lele Mendez MD normoactive  Extremities: No clubbing/cyanosis; moves all 4 extremities normally. 2+ LE edema bilaterally.        Current Facility-Administered Medications:  potassium chloride IVPB premix 40 mEq 40 mEq Intravenous Once   0.9%  NaCl infusion  Intravenous On

## 2018-04-27 NOTE — PROGRESS NOTES
Heme/Onc Progress Note    Patient Name: Shiela Talley   YOB: 1960   Medical Record Number: [id number]   CSN: [id number]   Attending Physician: Sharrie Councilman, M.D. Subjective:  Weak but looks slightly better today. Still with Jolley.   Had inhaler 1 puff, 1 puff, Inhalation, Daily  •  Piperacillin Sod-Tazobactam So (ZOSYN) 3.375 g in dextrose 5 % 100 mL ADD-vantage, 3.375 g, Intravenous, Q8H  •  TraZODone HCl (DESYREL) tab 50 mg, 50 mg, Oral, Nightly PRN  •  Senna-Docusate Sodium (SENOKOT S) seconds   INR 1.64 (H) 0.90 - 1.10   -CBC W/ DIFFERENTIAL   Collection Time: 04/26/18 10:30 PM   Result Value Ref Range   WBC 6.0 4.0 - 13.0 x10(3) uL   RBC 2.59 (L) 3.80 - 5.10 x10(6)uL   HGB 7.6 (L) 12.0 - 16.0 g/dL   HCT 23.1 (L) 34.0 - 50.0 %   PLT 77. but less today. K remains very low and being repleted. ? Consider repeating stat echo to see if tap is really necessary?      3) Metastatic Lung Carcinoma: SP first cycle of chemo/immunotherapy - In crisis right now due to liver dysfunction from metastases

## 2018-04-27 NOTE — PROGRESS NOTES
[facility]  Nephrology Progress Note    Johnna Law Patient Status:  Inpatient    1960 MRN [id number]   [facility] 8NE-A Attending Melani Stovall, DO   Hosp Day # 5 PCP Deadra Barthel       SUBJECTIVE:  Feels better today, notes res CREATSERUM  0.45*  0.60  0.59  0.56   CA  7.8*  8.1*  8.0*  8.2*   MG  2.0   --    --   1.2*   GLU  101*  98  94  91       Recent Labs   Lab  04/25/18   0612  04/26/18   0512  04/27/18   0705   ALT  99*  117*  93*   AST  278*  375*  280*   ALB  1.6*  2.9 to aggressive diuresis. Replace and follow closely        Questions/concerns were discussed with patient and/or family by bedside.           Mariam Arndt MD  4/27/2018  11:40 AM

## 2018-04-27 NOTE — PAYOR COMM NOTE
--------------  CONTINUED STAY REVIEW    Payor: Shanae CAMPO/NATO  Subscriber #:  [id number]  Authorization Number: [id number]    Admit date: 4/22/18  Admit time: Jose Johnston 1527    Admitting Physician: Keyona Gill MD  Attending Physician:  DO Ruy Taylor ELLIPTA) 62.5 MCG/INH inhaler 1 puff     Date Action Dose Route User    2018 0931 Given 1 puff Inhalation Mónica Tamayo RN        Pulmonary Progress Note          NAME: Rohit Villa - ROOM: 6232/7179-I - MRN: [id number] - Age: 60year old - :  INR  --   --   --   --  1.86*            Recent Labs    04/24/18  0509 04/25/18  0612 04/26/18  0512    140 140   K 3.5* 3.5* 2.3*    108 100*   CO2 22.0 22.0 28.0   BUN 15 16 18   CA 7.6* 7.8* 8.1*   MG  --  2.0  --             Recent Labs as tolerated  -wean O2 as tolerated  4. Emphysema - by imaging  - cont LAMA  - avoid duonebs/albuterol given issues with tachycardia previously  5. FEN  - ADAT  6. Ppx  - SCD  7.  Dispo  -d/w family at bedside     [facility]

## 2018-04-28 NOTE — PLAN OF CARE
Assumed care of patient around 0730, alert and oriented X4, calm with flat affect, jaundice with yellowing of the sclera, no c/o CP/SOB, SpO2 92 % on 2L NC  Rhythm/HR: ST 110s  Left chest part a cath  K+ and mag riders given this morning  Albumin and IV la

## 2018-04-28 NOTE — PROGRESS NOTES
Hematology/Oncology Progress Note    Patient Name: Wu Simms Record Number: [id number]    YOB: 1960     Reason for Consultation:  Yolette Collins was seen today for the diagnosis of metastatic lung cancer    Interval events:  Feelin 23.1*  25.4*   PLT  41.0*  77.0*  87.0*   MCV  88.8  89.2  89.4   RDW  18.6*  18.4*  18.8*   NEPRELIM  5.07  3.97  3.73       Recent Labs   Lab  04/26/18   0512  04/26/18 2230 04/27/18   0705  04/27/18   2142   NA  140  139  143   --    K  2.3*  2.1*  2 is pending.    7) Ascites: Liver dysfunction is likely cause- malignancy +/- chemo effects     8) Pleural effusion: Noted on current CT. Stable by CXR. Probably less given diuresis.      9)  Fluid Overload:  Was on furosemide drip with dramatic diuresis b

## 2018-04-28 NOTE — PROGRESS NOTES
Pulmonary Progress Note        NAME: Gautam Hodges - ROOM: 0584/4534-U - MRN: [id number] - Age: 60year old - : 1960        SUBJECTIVE: No events overnight, having trouble sleeping    OBJECTIVE:   182 18  0100 18  0557 18 04/25/18  0612 04/26/18  0512    140 140   K 3.5* 3.5* 2.3*    108 100*   CO2 22.0 22.0 28.0   BUN 15 16 18   CA 7.6* 7.8* 8.1*   MG  --  2.0  --          Recent Labs   04/24/18  0509 04/25/18  0612 04/26/18  0512   ALT 82* 99* 117*   * SCD  7. Dispo  -d/w family at bedside  -will see as needed tomorrow and resume on Monday      See [facility]

## 2018-04-28 NOTE — PROGRESS NOTES
[address] [facility] Cardiology  Progress Note    Lorne Solorio Patient Status:  Inpatient    1960 MRN [id number]   [facility] 4SW-A Attending Wesley Price MD   Hosp Day # 6 PCP Hemal Nguyen     Primary cardiologist: Giorgio Soto MD Medications:  potassium chloride IVPB premix 40 mEq 40 mEq Intravenous Once   magnesium sulfate IVPB premix 4 g 4 g Intravenous Once   Propranolol HCl (INDERAL) tab 10 mg 10 mg Oral BID   simethicone (MYLICON) chewable tab 80 mg 80 mg Oral QID PRN   0.9%

## 2018-04-28 NOTE — PLAN OF CARE
Problem: CARDIOVASCULAR - ADULT  Goal: Maintains optimal cardiac output and hemodynamic stability  INTERVENTIONS:  - Monitor vital signs, rhythm, and trends  - Monitor for bleeding, hypotension and signs of decreased cardiac output  - Evaluate effectivenes (undernourished)  INTERVENTIONS:  - Monitor percentage of each meal consumed  - Identify factors contributing to decreased intake, treat as appropriate  - Assist with meals as needed  - Monitor I&O, WT and lab values  - Obtain nutritional consult as needed

## 2018-04-28 NOTE — PROGRESS NOTES
[facility]  Nephrology Progress Note    Jacy Burgess Attending:  Claudia Osman,        Assessment and Plan:    1) Anasarca- due to metastatic CA with severe hypoalbuminemia; pericardial effusion less likely as a contributing factor given echo findi 04/28/2018   CO2 30.0 04/28/2018   GLU 94 04/28/2018   CA 7.9 04/28/2018   ALB 2.7 04/28/2018   ALKPHO 372 04/28/2018   BILT 10.1 04/28/2018   TP 5.1 04/28/2018    04/28/2018   ALT 83 04/28/2018   MG 1.4 04/28/2018       Imaging:   All imaging studie

## 2018-04-28 NOTE — PROGRESS NOTES
[facility] Hospitalist Progress Note     PCP: Greta Shock LEON    SUBJECTIVE:  No CP, SOB, or N/V.    OBJECTIVE:  Temp:  [98.2 °F (36.8 °C)-98.7 °F (37.1 °C)] 98.5 °F (36.9 °C)  Pulse:  [104-123] 117  Resp:  [16-20] 17  BP: (106-128)/(67-77) 116/71    Intake/Output 232*   ALB  2.9*  3.0*  2.7*   LDH  2,896*  2,879*   --        No results for input(s): PGLU in the last 72 hours. No results for input(s): TROP in the last 72 hours.     Imaging:  Echo - no tamponade (4/27)    Meds:     • potassium chloride  40 mEq Intr consultant  -due to pericardial effusion (mod-large with no tamponade on repeat bedside echo), no plan for pericardiocentesis at this time     Chronic hypoxic respiratory failure  -On chronic home O2 of 2 L  -currently at baseline levels  -Monitor o2     A

## 2018-04-29 NOTE — PROGRESS NOTES
[facility] Card[facility]  Progress Note    Jina Ward Patient Status:  Inpatient    1960 MRN [id number]   [facility] 4SW-A Attending Nanette Ford MD   Hosp Day # 7 PCP Aleja Dumont     Primary cardiologist: Luis Crespo MD No clubbing/cyanosis; moves all 4 extremities normally. 2+ LE edema bilaterally.        Current Facility-Administered Medications:  potassium chloride IVPB premix 40 mEq 40 mEq Intravenous Once   Followed by      potassium chloride IVPB premix 20 mEq 20 mEq 108 04/29/2018   CA 8.3 04/29/2018   MG 2.2 04/29/2018       Telemetry: Sinus tachycardia, 100-110s    Thank you for allowing our practice to participate in the care of your patient. Please do not hesitate to contact me if you have any questions.     Hilda Vieyra

## 2018-04-29 NOTE — PROGRESS NOTES
Hematology/Oncology Progress Note    Patient Name: Wu Simms Record Number: [id number]    YOB: 1960     Reason for Consultation:  Jesus Manuel Ritchie was seen today for the diagnosis of metastatic lung cancer    Interval events:  Feelin 7. 3   HGB  8.2*  9.1*  8.7*   HCT  25.4*  28.4*  27.7*   PLT  87.0*  80.0*  75.0*   MCV  89.4  91.6  92.6   RDW  18.8*  20.4*  21.5*   NEPRELIM  3.73  3.95  3.71       Recent Labs   Lab  04/27/18   0705  04/27/18   2142  04/28/18   0515  04/29/18   0533 which are suspicious for progression.    5) Anemia: Due to chemotherapy.  Will review blood smear and obtain haptoglobin to r/o hemolysis given elevated LDH and low fibringen, though suspect those labs are due to liver dysfunction and malignancy rather mauro

## 2018-04-29 NOTE — PROGRESS NOTES
[facility]  Nephrology Progress Note    Colt Clark Attending:  Tena Litten,        Assessment and Plan:    1) Anasarca- due to metastatic CA with severe hypoalbuminemia; pericardial effusion less likely as a contributing factor given echo findi 04/29/2018   PLT 75.0 04/29/2018   CREATSERUM 0.60 04/29/2018   BUN 25 04/29/2018    04/29/2018   K 3.4 04/29/2018   K 3.4 04/29/2018   CL 96 04/29/2018   CO2 31.0 04/29/2018    04/29/2018   CA 8.3 04/29/2018   ALB 2.9 04/29/2018   ALKPHO 378

## 2018-04-29 NOTE — PLAN OF CARE
Patient is alert and oriented. On 2L NC, ST on the tele. Denies any pain or SOB. Transfused cryoprecipitate, tolerated well. Plan lasix BID, Albumin, ultrasound of abdomen, and replace K. POC updated with patient and family, all questions answered.  Call li

## 2018-04-29 NOTE — PROGRESS NOTES
[facility] Hospitalist Progress Note     PCP: Cecile LEON    SUBJECTIVE:  No CP, SOB, or N/V. Pt feeling more fatigued today.     OBJECTIVE:  Temp:  [97.9 °F (36.6 °C)-98.6 °F (37 °C)] 98 °F (36.7 °C)  Pulse:  [109-118] 109  Resp:  [18-20] 20  BP: ()/(64 76*   AST  280*  232*  206*   ALB  3.0*  2.7*  2.9*   LDH  2,879*   --   2,563*         Meds:     • sodium chloride   Intravenous Once   • potassium chloride  40 mEq Intravenous Once    Followed by   • potassium chloride  20 mEq Intravenous Once   • furose consultant  -due to pericardial effusion (mod-large with no tamponade on repeat bedside echo), no plan for pericardiocentesis at this time     Chronic hypoxic respiratory failure  -On chronic home O2 of 2 L  -currently at baseline levels  -Monitor o2     A

## 2018-04-30 NOTE — PROGRESS NOTES
Heme/Onc Progress Note    Patient Name: Lorne Solorio   YOB: 1960   Medical Record Number: [id number]   CSN: [id number]   Attending Physician: Ling Velez M.D. Subjective:  Feels a bit better. Has been clear headed.   Has not required Nightly PRN  •  Senna-Docusate Sodium (SENOKOT S) 8.6-50 MG tab 1 tablet, 1 tablet, Oral, Daily PRN    Physical Examination:  General: Patient is alert and oriented x 3, not in acute distress.   Vital Signs: /64   Pulse 118   Temp 97.8 °F (36.6 °C) (O Potassium 3.5 (L) 3.6 - 5.1 mmol/L   Chloride 97 (L) 101 - 111 mmol/L   CO2 29.0 22.0 - 32.0 mmol/L   -FIBRINOGEN ACTIVITY   Collection Time: 04/30/18  4:49 AM   Result Value Ref Range   Fibrinogen 105 (L) 200 - 446 mg/dl   -PROTHROMBIN TIME (PT)   Nuzhat Mace 4    Total Cells Counted 100    RBC Morphology Normal Normal   Platelet Morphology Normal Normal       Radiology:  US with modest ascites    Impression and Plan:  1) Possible Sepsis: Elevated Lactic acid due to volume of malignancy.  Cultures remain negati fibrinogen tomorrow. 12)  Resuscitation status - discussed DNR with them.   Explained that we are still planning all other support, but if she were to have an arrest and required CPR and/or intubation that she would not recover to the point of having th

## 2018-04-30 NOTE — PROGRESS NOTES
[facility]  Nephrology Progress Note    Ascension Repress Patient Status:  Inpatient    1960 MRN [id number]   [facility] 8NE-A Attending Thang Covington, DO   Hosp Day # 8 PCP Cy Gonzalez       SUBJECTIVE:  Notes ongoing weakness, c/o a --   1.4*  2.2  2.0   PHOS   --    --    --   1.0*   GLU   --   94  108*  108*       Recent Labs   Lab  04/28/18   0643  04/29/18   0533  04/30/18   0449   ALT  83*  76*  72*   AST  232*  206*  249*   ALB  2.7*  2.9*  3.1*   LDH   --   2,563*   --        N to aggressive diuresis. Replace and follow closely        Questions/concerns were discussed with patient and/or family by bedside.           Ludwig Hammer MD  4/30/2018  11:50 AM

## 2018-04-30 NOTE — PAYOR COMM NOTE
--------------  CONTINUED STAY REVIEW    Payor: Corrie Wylie POS/NATO  Subscriber #:  [id number]  Authorization Number: [id number]    Admit date: 4/22/18  Admit time: Jose Johnston 1527    Admitting Physician: Zaire Alvarez MD  Attending Physician:  Josiah Villanueva DO  Prim for now.       Plan:  1. continue propranolol 10mg bid  2. defer pericardiocentesis       4/29/18 -     Hematology/Oncology Progress Note    Lab  04/27/18   0705  04/28/18   0643  04/29/18   0534   WBC  5.9  7.2  7.3   HGB  8.2*  9.1*  8.7*   HCT  25.4*  2 baseline at this time with episodes of confusion related to meds.  CT done in ER does not show any evidence of bleed but there is a new lesion which are suspicious for progression.    5) Anemia: Due to chemotherapy.  Will review blood smear and obtain hapt ABO O     UNIT RH POOLED RH     Product Status Presumed Transfused     Expiration Date [id number]     Blood Type Barcode D000        Radiology:  US with modest ascites    Impression and Plan:  1) Possible Sepsis: Now off empiric Zosyn/vancomycin- remains LAST 1 DAY:  Albumin Human (ALBUMINAR) 25 % solution 25 g     Date Action Dose Route User    4/29/2018 2034 New Bag 25 g Intravenous Natalya Vance, RN         furosemide (LASIX) injection 40 mg     Date Action Dose Route User    4/29/2018 1802 Given 40 mg

## 2018-04-30 NOTE — PROGRESS NOTES
[facility] hospitalist daily note  Patient was seen/examined on 4/30/18    S: feels tired, no chest pain, no SOB at this time, no nausea    Medications in EPIC    PE 99.1 98/61 2L NC 92% o2 sat  Gen: awake, alert,O2 NC in place, + jaundice  HEENT; mmm  CV:  nl S1  Pt and family expressed understanding  -per onc, to limit po and IV ativan prn d/t hallucinations.  IV dilaudid prn was ordered per onc      Hypoalbuminemia contributing to anasarca       Hypokalemia-replete prn, monitor          Prophylaxis:   DVT with S

## 2018-04-30 NOTE — CM/SW NOTE
CULLEN spoke with Mr Rena Parikh via phone. He would like a palliative care consult after 2pm on Tuesday 5/1/2018. Order placed. CULLEN/CORY to follow.

## 2018-04-30 NOTE — PROGRESS NOTES
Pulmonary Progress Note      NAME: Keshav Erazo - ROOM: 1297/0587-A - MRN: [id number] - Age: 60year old - : 1960    Assessment/Plan:  1. Stage IV adenocarcinoma  - per onc  2.  Chronic hypoxic respiratory failure - due to emphysema and RLL atelectas 29.8   MCHC  31.9   RDW  22.7*   NEPRELIM  5.84   WBC  11.2   PLT  78.0*     Recent Labs   Lab  04/28/18   0643  04/29/18   0533  04/30/18   0449   GLU  94  108*  108*   BUN  20  25*  25*   CREATSERUM  0.52*  0.60  0.66   GFRAA  121  115  112   GFRNAA  105

## 2018-04-30 NOTE — PROGRESS NOTES
[address] [facility] Card[facility]  Progress Note    Lily Menjivar Patient Status:  Inpatient    1960 MRN [id number]   [facility] 4SW-A Attending Bettie Tavera MD   Hosp Day # 8 PCP Shahbaz Barry     Primary cardiologist: Shantanu Brady MD Clear to auscultation bilaterally; no accessory muscle use  Abdomen: Soft, non-tender, distended, bowel sounds are normoactive  Extremities: No clubbing/cyanosis; moves all 4 extremities normally. 2+ LE edema bilaterally.        Current Facility-Administere Telemetry: Sinus tachycardia, 100-110s    Thank you for allowing our practice to participate in the care of your patient. Please do not hesitate to contact me if you have any questions.     José Muse MD

## 2018-05-01 PROBLEM — Z71.89 GOALS OF CARE, COUNSELING/DISCUSSION: Status: ACTIVE | Noted: 2018-01-01

## 2018-05-01 PROBLEM — Z51.5 PALLIATIVE CARE ENCOUNTER: Status: ACTIVE | Noted: 2018-01-01

## 2018-05-01 NOTE — PALLIATIVE CARE NOTE
PC consult received. PCSW reviewed initial assessment completed by  Unit CULLEN/Lulu. PC APN to follow-up for goals of care; PCSW will then follow-up based on the outcome of that discussion.

## 2018-05-01 NOTE — PROGRESS NOTES
Heme/Onc Progress Note    Patient Name: Donavan Liu   YOB: 1960   Medical Record Number: [id number]   CSN: [id number]   Attending Physician: Karen Jaramillo M.D. Subjective:  Still weak and having some pain. No significant improvement. Senna-Docusate Sodium (SENOKOT S) 8.6-50 MG tab 1 tablet, 1 tablet, Oral, Daily PRN    Physical Examination:  General: Patient is alert and oriented x 3, not in acute distress.   Vital Signs: BP 95/64 (BP Location: Left arm)   Pulse 119   Temp 98.1 °F (36.7 Time: 05/01/18  5:25 AM   Result Value Ref Range   Magnesium 1.9 1.8 - 2.5 mg/dL   -PHOSPHORUS   Collection Time: 05/01/18  5:25 AM   Result Value Ref Range   Phosphorus 2.2 (L) 2.5 - 4.9 mg/dL   -CBC W/ DIFFERENTIAL   Collection Time: 05/01/18  5:25 AM liver dysfunction from metastases.  [address] decreased initially but LFT's and bili are now rising. Plan to obtain CT of Chest and abdomen with IV contrast to assess disease burden in mediastinum and liver - Have spoken to  at length - he understands mauro

## 2018-05-01 NOTE — PROGRESS NOTES
[facility] Cardiology  Progress Note    Keshav Erazo Patient Status:  Inpatient    1960 MRN [id number]   [facility] 4SW-A Attending Zhen Maloney MD   [facility] Day # 9 PCP Katt Odell     Primary cardiologist: Emmy Batista MD murmurs/rubs/gallops are appreciated  Lungs: Clear to auscultation bilaterally; no accessory muscle use  Abdomen: Soft, non-tender, distended, bowel sounds are normoactive  Extremities: No clubbing/cyanosis; moves all 4 extremities normally.  2+ LE edema bi CREATSERUM 0.72 05/01/2018    05/01/2018   CA 8.2 05/01/2018   MG 1.9 05/01/2018       Telemetry: Sinus tachycardia, 100-110s    Thank you for allowing our practice to participate in the care of your patient.  Please do not hesitate to contact me i

## 2018-05-01 NOTE — PROGRESS NOTES
Spoke to  about CT - no improvement and mild worsening. I think that she is not a candidate for any more treatment and all our efforts will have to be palliative.   I will ask palliative care team to initiate hospice plans - I will be physician of r

## 2018-05-01 NOTE — CONSULTS
[address] Initial Consult      Ismael Fontana  [id number]  Hospital Day #9  Date of Consult: 05/01/18       Reason for Consultation: Consult requested for evaluation of palliative care needs and goals of care discussion.     Hi [phone]  Describe Patient Wishes: DNR    Symptoms(s): denies complaints    Psychosocial: Emotional support provided to patient and spouse.    Palliative Care  Kaia Carlos, pager [phone] will follow for discharge planning and bedside counselin

## 2018-05-01 NOTE — PAYOR COMM NOTE
--------------  CONTINUED STAY REVIEW    Payor: Fabiano CAMPO/NATO  Subscriber #:  [id number]  Authorization Number: [id number]    Admit date: 4/22/18  Admit time: Jose Johnston 1527    Admitting Physician: Elijah Mejia MD  Attending Physician:  Bradford Benitez MD  Prim 2.2 (L) 2.5 - 4.9 mg/dL   -CBC W/ DIFFERENTIAL   Collection Time: 05/01/18  5:25 AM   Result Value Ref Range   WBC 14.9 (H) 4.0 - 13.0 x10(3) uL   RBC 2.84 (L) 3.80 - 5.10 x10(6)uL   HGB 8.6 (L) 12.0 - 16.0 g/dL   HCT 26.8 (L) 34.0 - 50.0 %   PLT 84.0 (L) Chelsie Swan, RN      iohexol (OMNIPAQUE) 350 MG/ML injection 100 mL     Date Action Dose Route User    5/1/2018 1013 Given 100 mL Intravenous Vanesa Chandler      LORazepam (ATIVAN) tab 0.5 mg     Date Action Dose Route User    5/1/2018 0100 Given 0.5 mg

## 2018-05-01 NOTE — PROGRESS NOTES
[facility] hospitalist daily note  Patient was seen/examined on 5/1/18     S: resting     Medications in EPIC     PE    05/01/18  1100   BP: 108/70   Pulse: 118   Resp: 17   Temp: 100 °F (37.8 °C)     Gen: no acute respiratory distress  Neck supple  CV:  nl S1/S2 IV ativan prn d/t hallucinations.  IV dilaudid prn was ordered per onc      Hypoalbuminemia contributing to anasarca        Hypokalemia-replete prn, monitor           Prophylaxis:   DVT with SCD's, no ppx anticoagulation given thrombocytopenia     Christel Flatness Do

## 2018-05-01 NOTE — DIETARY NOTE
NUTRITION INITIAL ASSESSMENT    Pt is at moderate nutrition risk. Pt does not meet malnutrition criteria.     NUTRITION DIAGNOSIS/PROBLEM:    Inadequate energy intake related to inability to consume sufficient energy as evidenced by estimated intake less th Will send low fluid ONS and monitor intake    ANTHROPOMETRICS:  Ht: 162.6 cm (5' 4\")  Wt: 85.6 kg (188 lb 11.4 oz). This is 158 % of IBW  BMI: Body mass index is 32.39 kg/m².   IBW: 55 kg  Usual Body Wt: 68 kg    WEIGHT HISTORY:   Wt Readings from Last 6 E

## 2018-05-01 NOTE — PLAN OF CARE
Maintains adequate nutritional intake (undernourished) Not Progressing    Pt has very poor appetite, only taking bites of food and sips of Coke or Ensure. Maintains hematologic stability Not Progressing    Hgb 8.6, plt 84.   Achieve highest/safest level of

## 2018-05-01 NOTE — PROGRESS NOTES
[facility]  Nephrology Progress Note    Nereyda Kirby Patient Status:  Inpatient    1960 MRN [id number]   [facility] 8NE-A Attending Dahlia Hernandez, DO   Hosp Day # 9 PCP Luciano Bullock       SUBJECTIVE:  Remains very weak      Physic 108*  108*  111*       Recent Labs   Lab  04/29/18   0533  04/30/18   0449  05/01/18   0525   ALT  76*  72*  75*   AST  206*  249*  278*   ALB  2.9*  3.1*  3.0*   LDH  2,563*   --   3,060*       No results for input(s): PGLU in the last 72 hours.     Meds: whether hospice is most appropriate.     #3. pericardial effusion/tachycardia- repeat echo noted. Cards following     #4. Hypokalemia- related to aggressive diuresis.   Replace and follow closely        Questions/concerns were discussed with patient and/o

## 2018-05-02 NOTE — PROGRESS NOTES
Heme/Onc Progress Note    Patient Name: Jarrod Lino   YOB: 1960   Medical Record Number: [id number]   CSN: [id number]   Attending Physician: Sukhi Mack M.D. Subjective:  Weak - reasonably comfortable.   Used dilaudid with good relie Senna-Docusate Sodium (SENOKOT S) 8.6-50 MG tab 1 tablet, 1 tablet, Oral, Daily PRN    Physical Examination:  General: Patient is arousable and oriented x 3, but lethargic and weak.   Vital Signs: /67 (BP Location: Left arm)   Pulse 121   Temp 98.7 °F vs 10 x 10 mm on most recent contrast enhanced CT of the chest performed  3/30/2018. Right paratracheal conglomerate of nodes measures approximately 2.4 x 1.9 versus 2.4 x 2.0 cm.   To the left   of the marcello is a 2.8 x 1.7 vs 2.9 x 1.8 cm conglomerate no within the portal caval region and measures 1.0 x 1.2 versus 0.9 x 1.3 cm. BOWEL/MESENTERY:  Several slightly enlarged small bowel loops are present. Increasing amount of ascites.     ABDOMINAL WALL:  Subcutaneous edema involves the mid abdominal latera a DNR status. I would like to remain physician of record - please give the hospice team my cell phone - [phone]. Discharge will depend on arrangements.      understands that issues like low grade fever will be dealt with pragmatically - ty

## 2018-05-02 NOTE — PALLIATIVE CARE NOTE
[address] Follow Up      Eliud Pass  [id number]       Patient seen and evaluated, family (spouse) at bedside.      Physical Exam:  GEN:  Jaundice  Neuro:  asleep  Respiratory:  Respirations even and unlabored    Assessment/

## 2018-05-02 NOTE — PROGRESS NOTES
[facility] Card[facility]  Progress Note    Marisol Lopez Patient Status:  Inpatient    1960 MRN [id number]   [facility] 4SW-A Attending Xiomara Upton MD   Hosp Day # 10 PCP Ky Patterson     Primary cardiologist: Tino Krause MD sounds are normoactive  Extremities: No clubbing/cyanosis; moves all 4 extremities normally. 2+ LE edema bilaterally.        Current Facility-Administered Medications:  diphenhydrAMINE (BENADRYL) cap/tab 25 mg 25 mg Oral Q8H PRN   Propranolol HCl (INDERAL)

## 2018-05-02 NOTE — PROGRESS NOTES
[facility] hospitalist daily note  Patient was seen/examined on 5/2/18    S: resting, had some itching and received benadryl with improvement    Medications in EPIC    PE  97.5  111/74  Gen: no acute respiratory distress  Neck supple  CV:  nl S1/S2  Pulm: no whee

## 2018-05-02 NOTE — PALLIATIVE CARE NOTE
Per Dr. Swetha Lee the plan is for pt to return home with Hospice. Dr. Swetha Lee requesting to remain the physician of record-- and be contacted at [phone]. PCSW met with pts  and reviewed hospice providers.  Pts  requested a referral be sent

## 2018-05-02 NOTE — PAYOR COMM NOTE
--------------  CONTINUED STAY REVIEW    Payor: Juan CMAPO/NATO  Subscriber #:  [id number]  Authorization Number: [id number]    Admit date: 4/22/18  Admit time: Jose Johnston 1527    Admitting Physician: Guido Pillai MD  Attending Physician:  Holli Posada MD  Prim Date Action Dose Route User    5/2/2018 0830 Given 25 mg Oral Enoch Arriaga RN      folic acid (FOLVITE) tab 1 mg     Date Action Dose Route User    5/2/2018 0830 Given 1 mg Oral Enoch Arriaga RN      furosemide (LASIX) injection 40 mg     Date Action Dose

## 2018-05-03 NOTE — PAYOR COMM NOTE
--------------  CONTINUED STAY REVIEW    Payor: [employer]  Subscriber #:  [id number]  Authorization Number: [id number]    Admit date: 4/22/18  Admit time: Jose Johnston 1527    Admitting Physician: Krystle Hendricks MD  Attending Physician:  Natalie Mendiola MD  Prim

## 2018-05-03 NOTE — PALLIATIVE CARE NOTE
Spoke with Saeid Okeefe RN/[facility] [phone] who states she will present at the hospital between 1-2pm to arrange d/c. PCSW spoke with pts /Deng who states he is at home waiting for equipment to be delivered.  Pts  will not be returning upon d

## 2018-05-03 NOTE — PROGRESS NOTES
Heme/Onc Progress Note    Patient Name: Chris Leblanc   YOB: 1960   Medical Record Number: [id number]   CSN: [id number]   Attending Physician: Marleny Morales M.D. Subjective:  Relatively quiet night.   More pruritus which is adequately ad Examination:  General: Patient is alert and oriented x 3, not in acute distress. Vital Signs: /69 (BP Location: Left arm)   Pulse 118   Temp 98.3 °F (36.8 °C) (Oral)   Resp 20   Ht 1.626 m (5' 4\")   Wt 85.6 kg (188 lb 11.4 oz)   SpO2 95%   BMI 32. 3

## 2018-05-04 NOTE — PROGRESS NOTES
Discharge instructions discussed with pt's  and given. Instructed and demonstrated to  serrato care and emptying serrato. [facility]. Port a cath de-cannulated per SANDRINE garcia's d/cd tele d/cd.

## 2018-05-05 NOTE — PAYOR COMM NOTE
--------------  DISCHARGE REVIEW    Payor: [employer]  Subscriber #:  [id number]  Authorization Number: [id number]    Admit date: 4/22/18  Admit time:  0143  Discharge Date: 5/3/2018  6:30 PM     Admitting Physician: Dick Hess MD  Attending Thad (ZOFRAN) injection 4 mg, 4 mg, Intravenous, Q6H PRN  •  guaiFENesin-codeine (ROBITUSSIN AC) 100-10 MG/5ML syrup 5 mL, 5 mL, Oral, Q6H PRN  •  Umeclidinium Bromide (INCRUSE ELLIPTA) 62.5 MCG/INH inhaler 1 puff, 1 puff, Inhalation, Daily  •  TraZODone HCl (D

## 2018-05-20 NOTE — DISCHARGE SUMMARY
General Medicine Discharge Summary     Patient ID:  Ismael Fontana  60year old  4/1/1960    Admit date: 4/21/2018    Discharge date and time: 5/3/2018  6:30 PM     Attending Physician: Vandana att. providers fo BMP     Ascites  Likely liver dysfunction is contrubuting, hypoalbuminemia  Diuretics and albumin during admission        Acute anxiety, panic attacks  Plan for hospice     Hypoalbuminemia contributing to anasarca     Hypokalemia-replete prn, monitor    cannot be excluded without intravenous contrast.  KIMBERLYN:  Limited evaluation due the lack of contrast however the extensive bilateral hilar lymphadenopathy is not significantly changed in the.   Some of these lymph nodes contain faint calcifications MEDIASTI Exams, MRI BRAIN (W+WO) (CPT=70553), 4/03/2018, 13:18. INDICATIONS:  fever and dyspnea history of metastatic brain disease. TECHNIQUE:  Noncontrast CT scanning is performed through the brain. Dose reduction techniques were used.  Dose information is trans This is stable compared to the recent MRI. CONCLUSION:  1.  Stable 0.8 by 0.6 cm lesion right cerebellum shown to most likely represent a metastatic enhancing lesion on the recent MRI of 4/3/2018. Stable 0.7 cm lytic lesion right parietal bone.  2. There Mild atrophy no hydrocephalus. INTRACRANIAL:  No evidence of intracranial hemorrhage. There is a rounded focus of decreased CT attenuation in the posterior right cerebellum measuring 0.8 x 0.6 cm unchanged.   This represented an enhancing lesion on the rec would include cavernoma, metastatic disease, infectious disease, hemorrhage. Due to the  small calcifications acute hemorrhage be considered unlikely.   MRI of the brain with contrast would be recommended to evaluate this lesion and a new left cerebellar l 3. 4 cm. These are compared to contrast-enhanced CT performed 3/30/2018. CARDIAC:  Coronary artery calcifications.  PLEURA:  Small right pleural effusion measures 2 vs 2.2 cm in depth with small left pleural effusion measuring 8 mm in depth when compared t CT of the chest performed 4/21/2018, slight interval decrease in size of right pleural effusion with development of small left pleural effusion. Persistent bibasilar compressive atelectasis and/or consolidation. Persistent pericardial effusion.  Increase subclavian port in stable position. Enlarged cardiac silhouette, unchanged. Elevation of the right hemidiaphragm. There is a mild to moderate layering right pleural effusion, unchanged. No new region of focal consolidation is seen.   Mild right basilar Nausea., Normal, Disp-30 tablet, R-3    LORazepam 0.5 MG Oral Tab  Take 1 tablet (0.5 mg total) by mouth 2 (two) times daily as needed for Anxiety. , Print Script, Disp-30 tablet, R-1    guaiFENesin-codeine 100-10 MG/5ML Oral Solution  Take 5 mL by mouth ev
